# Patient Record
Sex: FEMALE | Race: WHITE | Employment: PART TIME | URBAN - METROPOLITAN AREA
[De-identification: names, ages, dates, MRNs, and addresses within clinical notes are randomized per-mention and may not be internally consistent; named-entity substitution may affect disease eponyms.]

---

## 2020-10-21 ENCOUNTER — OFFICE VISIT (OUTPATIENT)
Dept: CARDIOLOGY CLINIC | Age: 55
End: 2020-10-21
Payer: COMMERCIAL

## 2020-10-21 VITALS
WEIGHT: 165 LBS | DIASTOLIC BLOOD PRESSURE: 82 MMHG | OXYGEN SATURATION: 98 % | HEART RATE: 87 BPM | SYSTOLIC BLOOD PRESSURE: 126 MMHG | HEIGHT: 62 IN | BODY MASS INDEX: 30.36 KG/M2

## 2020-10-21 DIAGNOSIS — Z82.49 FAMILY HISTORY OF EARLY CAD: ICD-10-CM

## 2020-10-21 DIAGNOSIS — R53.1 PARESTHESIAS WITH SUBJECTIVE WEAKNESS: ICD-10-CM

## 2020-10-21 DIAGNOSIS — R07.9 CHEST PAIN, UNSPECIFIED TYPE: ICD-10-CM

## 2020-10-21 DIAGNOSIS — R20.2 PARESTHESIAS WITH SUBJECTIVE WEAKNESS: ICD-10-CM

## 2020-10-21 DIAGNOSIS — I49.3 PVC (PREMATURE VENTRICULAR CONTRACTION): ICD-10-CM

## 2020-10-21 DIAGNOSIS — R00.2 PALPITATIONS: ICD-10-CM

## 2020-10-21 DIAGNOSIS — J45.909 UNCOMPLICATED ASTHMA, UNSPECIFIED ASTHMA SEVERITY, UNSPECIFIED WHETHER PERSISTENT: ICD-10-CM

## 2020-10-21 DIAGNOSIS — R07.9 CHEST PAIN, UNSPECIFIED TYPE: Primary | ICD-10-CM

## 2020-10-21 PROCEDURE — 93000 ELECTROCARDIOGRAM COMPLETE: CPT | Performed by: INTERNAL MEDICINE

## 2020-10-21 PROCEDURE — 99204 OFFICE O/P NEW MOD 45 MIN: CPT | Performed by: INTERNAL MEDICINE

## 2020-10-21 RX ORDER — PREGABALIN 100 MG/1
CAPSULE ORAL
COMMUNITY
Start: 2020-09-28

## 2020-10-21 RX ORDER — BISMUTH SUBSALICYLATE 262 MG
1 TABLET,CHEWABLE ORAL DAILY
COMMUNITY
End: 2021-08-11

## 2020-10-21 NOTE — LETTER
10/21/2020 8:53 AM 
 
Ms. Zannie Brunner [address] [facility] [id number] [place] [address] Zannie Brunner was seen in our office on 10/21/2020 for cardiac evaluation. Please feel free to contact our office if you have any questions regarding this patient. Sincerely, Russell Patricia MD

## 2020-10-21 NOTE — PROGRESS NOTES
History of Present Illness:  42-year-old female referred for atypical left-sided chest pain associated with occasional palpitations and heart racing. She just has not felt herself about the past month. She has no syncope. She does have a history of asthma with recently starting new inhaler, but symptoms do not seem to correlate with this. She does have a family history of blood clots and heart disease, specifically with her mother. She tells me her mother actually flat lined during one of her pregnancies. No tobacco or alcohol use. She works at the [employer]. Impression:  1. Recent atypical chest pain. 2. Ongoing palpitations and paresthesias, unclear etiology. 3. History of asthma, on inhalers. 4. Strong family history of heart disease, as well as DVT. 5. Recent Holter monitor for about 24 hours, showing sinus tachycardia only. Plan:  I discussed with her her symptoms. At this point, for further risk stratification we will obtain an echocardiogram, as well as nuclear stress test, especially given her family history. Her LDL was 110s. At this point I would just recommend diet, exercise and close monitoring. I talked about the possibility that this could be sinus tachycardia, physiologic, but the time interval in which we monitored was somewhat limited, therefore we will proceed to event monitor for about a week. I will see her back after testing. All questions answered. Past Medical History:   Diagnosis Date    Asthma     Nerve damage     Shingles        Current Outpatient Medications   Medication Sig Dispense Refill    pregabalin (LYRICA) 100 mg capsule TAKE 1 CAPSULE BY MOUTH EVERY EVENING      cetirizine HCl (ZYRTEC PO) Take 10 mg by mouth nightly.  multivitamin (ONE A DAY) tablet Take 1 Tab by mouth daily. Social History   reports that she has never smoked. She has never used smokeless tobacco.   reports no history of alcohol use.     Family History  family history includes Cancer in her father; Deep Vein Thrombosis in her mother; Heart Attack in her mother. Review of Systems  Except as stated above include:  Constitutional: Negative for fever, chills and malaise/fatigue. HEENT: No congestion or recent URI. Gastrointestinal: No nausea, vomiting, abdominal pain, bloody stools. Pulmonary:  Negative except as stated above. Cardiac:  Negative except as stated above. Musculoskeletal: Negative except as stated above. Neurological:  No localized symptoms. Skin:  Negative except as stated above. Psych:  Negative except as stated above. Endocrine:  Negative except as stated above. PHYSICAL EXAM  BP Readings from Last 3 Encounters:   10/21/20 126/82     Pulse Readings from Last 3 Encounters:   10/21/20 87     Wt Readings from Last 3 Encounters:   10/21/20 74.8 kg (165 lb)     General:   Well developed, well groomed. Head/Neck:   No obvious jugular venous distention     No obvious carotid pulsations. No evidence of xanthelasma. Lungs:   No respiratory distress. Clear bilaterally. Heart:    Regular rate and rhythm. Normal S1/S2. Palpation grossly normal.    No significant murmurs, rubs or gallops. Abdomen:   Non-acute abdomen. No obvious pulsations. Extremities:   Intact peripheral pulses. No significant edema. Neurological:   Alert and oriented to person, place, time. No focal neurological deficit visually. Skin:   No obvious rash    Blood Pressure Metric:  Monitor recommended and adjustments stated if needed.

## 2020-11-05 ENCOUNTER — TELEPHONE (OUTPATIENT)
Dept: CARDIOLOGY CLINIC | Age: 55
End: 2020-11-05

## 2020-11-06 LAB
ECHO AO ROOT DIAM: 2.94 CM
ECHO LA AREA 4C: 16.5 CM2
ECHO LA VOL 2C: 49.69 ML (ref 22–52)
ECHO LA VOL 4C: 41.02 ML (ref 22–52)
ECHO LA VOL BP: 49.96 ML (ref 22–52)
ECHO LA VOL/BSA BIPLANE: 28.36 ML/M2 (ref 16–28)
ECHO LA VOLUME INDEX A2C: 28.21 ML/M2 (ref 16–28)
ECHO LA VOLUME INDEX A4C: 23.29 ML/M2 (ref 16–28)
ECHO LV E' LATERAL VELOCITY: 12.3 CM/S
ECHO LV E' SEPTAL VELOCITY: 8.97 CM/S
ECHO LV INTERNAL DIMENSION DIASTOLIC: 3.87 CM (ref 3.9–5.3)
ECHO LV INTERNAL DIMENSION SYSTOLIC: 2.38 CM
ECHO LV IVSD: 1.07 CM (ref 0.6–0.9)
ECHO LV MASS 2D: 131.2 G (ref 67–162)
ECHO LV MASS INDEX 2D: 74.5 G/M2 (ref 43–95)
ECHO LV POSTERIOR WALL DIASTOLIC: 1.05 CM (ref 0.6–0.9)
ECHO LVOT CARDIAC OUTPUT: 4.44 LITER/MINUTE
ECHO LVOT DIAM: 1.96 CM
ECHO LVOT PEAK GRADIENT: 3.25 MMHG
ECHO LVOT PEAK VELOCITY: 90.15 CM/S
ECHO LVOT SV: 61.5 ML
ECHO LVOT VTI: 20.41 CM
ECHO MV A VELOCITY: 92.22 CM/S
ECHO MV E DECELERATION TIME (DT): 161.74 MS
ECHO MV E VELOCITY: 75.37 CM/S
ECHO MV E/A RATIO: 0.82
ECHO MV E/E' LATERAL: 6.13
ECHO MV E/E' RATIO (AVERAGED): 7.27
ECHO MV E/E' SEPTAL: 8.4
ECHO PVEIN A DURATION: 98.33 MS
ECHO PVEIN A VELOCITY: 31.27 CM/S
ECHO RV TAPSE: 1.95 CM (ref 1.5–2)
ECHO TV REGURGITANT MAX VELOCITY: 169.68 CM/S
ECHO TV REGURGITANT PEAK GRADIENT: 11.52 MMHG
LVOT MG: 1.68 MMHG
STRESS BASELINE DIAS BP: 70 MMHG
STRESS BASELINE HR: 78 BPM
STRESS BASELINE SYS BP: 110 MMHG
STRESS PEAK SYS BP: NORMAL MMHG
STRESS PERCENT HR ACHIEVED: 73 %
STRESS POST PEAK HR: 120 BPM
STRESS ST DEPRESSION: 0 MM
STRESS ST ELEVATION: 0 MM
STRESS STAGE 1 BP: NORMAL MMHG
STRESS STAGE 1 DURATION: 3 MIN:SEC
STRESS STAGE 1 HR: 120 BPM
STRESS STAGE RECOVERY 1 BP: NORMAL MMHG
STRESS STAGE RECOVERY 1 DURATION: 1 MIN:SEC
STRESS STAGE RECOVERY 1 HR: 98 BPM
STRESS TARGET HR: 165 BPM

## 2020-11-09 NOTE — PROGRESS NOTES
Per your last note \" I discussed with her her symptoms. At this point, for further risk stratification we will obtain an echocardiogram, as well as nuclear stress test, especially given her family history. Her LDL was 110s. At this point I would just recommend diet, exercise and close monitoring. I talked about the possibility that this could be sinus tachycardia, physiologic, but the time interval in which we monitored was somewhat limited, therefore we will proceed to event monitor for about a week. I will see her back after testing. All questions answered.

## 2020-11-10 ENCOUNTER — TELEPHONE (OUTPATIENT)
Dept: CARDIOLOGY CLINIC | Age: 55
End: 2020-11-10

## 2020-11-10 NOTE — TELEPHONE ENCOUNTER
----- Message from Mikaela Jewell MD sent at 11/9/2020  8:01 AM EST -----  Please let her know echo normal but stress test might be mildy abnormal, cannot tell for sure as there is \"artifact\", meaning part of the diaphragm pushing up on heart. Please move up her apt so I can discuss in person. Thx  ----- Message -----  From: Yamile Orantes RN  Sent: 11/9/2020   6:59 AM EST  To: Mikaela Jewell MD    Per your last note \" I  discussed with her her symptoms. At this point, for further risk stratification we will obtain an echocardiogram, as well as nuclear stress test, especially given her family history. Her LDL was 110s. At this point I would just recommend diet, exercise and close monitoring. I talked about the possibility that this could be sinus tachycardia, physiologic, but the time interval in which we monitored was somewhat limited, therefore we will proceed to event monitor for about a week. I will see her back after testing.   All questions answered.

## 2020-11-12 ENCOUNTER — OFFICE VISIT (OUTPATIENT)
Dept: CARDIOLOGY CLINIC | Age: 55
End: 2020-11-12
Payer: COMMERCIAL

## 2020-11-12 VITALS
DIASTOLIC BLOOD PRESSURE: 78 MMHG | WEIGHT: 166 LBS | BODY MASS INDEX: 30.55 KG/M2 | OXYGEN SATURATION: 98 % | SYSTOLIC BLOOD PRESSURE: 122 MMHG | HEIGHT: 62 IN

## 2020-11-12 DIAGNOSIS — R94.39 ABNORMAL STRESS TEST: Primary | ICD-10-CM

## 2020-11-12 DIAGNOSIS — R07.9 CHEST PAIN, UNSPECIFIED TYPE: ICD-10-CM

## 2020-11-12 DIAGNOSIS — R00.2 PALPITATIONS: ICD-10-CM

## 2020-11-12 DIAGNOSIS — I49.3 PVC (PREMATURE VENTRICULAR CONTRACTION): ICD-10-CM

## 2020-11-12 DIAGNOSIS — R94.39 ABNORMAL STRESS TEST: ICD-10-CM

## 2020-11-12 DIAGNOSIS — Z82.49 FAMILY HISTORY OF EARLY CAD: ICD-10-CM

## 2020-11-12 LAB
A-G RATIO,AGRAT: 1.3 RATIO (ref 1.1–2.6)
ABSOLUTE LYMPHOCYTE COUNT, 10803: 1.6 K/UL (ref 1–4.8)
ALBUMIN SERPL-MCNC: 3.9 G/DL (ref 3.5–5)
ALP SERPL-CCNC: 86 U/L (ref 25–115)
ALT SERPL-CCNC: 16 U/L (ref 5–40)
ANION GAP SERPL CALC-SCNC: 10 MMOL/L (ref 3–15)
AST SERPL W P-5'-P-CCNC: 18 U/L (ref 10–37)
BASOPHILS # BLD: 0.1 K/UL (ref 0–0.2)
BASOPHILS NFR BLD: 1 % (ref 0–2)
BILIRUB SERPL-MCNC: 0.5 MG/DL (ref 0.2–1.2)
BUN SERPL-MCNC: 12 MG/DL (ref 6–22)
CALCIUM SERPL-MCNC: 9.7 MG/DL (ref 8.4–10.5)
CHLORIDE SERPL-SCNC: 103 MMOL/L (ref 98–110)
CO2 SERPL-SCNC: 29 MMOL/L (ref 20–32)
CREAT SERPL-MCNC: 0.7 MG/DL (ref 0.5–1.2)
EOSINOPHIL # BLD: 0.2 K/UL (ref 0–0.5)
EOSINOPHIL NFR BLD: 2 % (ref 0–6)
ERYTHROCYTE [DISTWIDTH] IN BLOOD BY AUTOMATED COUNT: 13.1 % (ref 10–15.5)
GFRAA, 66117: >60
GFRNA, 66118: >60
GLOBULIN,GLOB: 2.9 G/DL (ref 2–4)
GLUCOSE SERPL-MCNC: 91 MG/DL (ref 70–99)
GRANULOCYTES,GRANS: 65 % (ref 40–75)
HCT VFR BLD AUTO: 38.2 % (ref 35.1–48)
HGB BLD-MCNC: 12.4 G/DL (ref 11.7–16)
INR PPP: 1.03 (ref 0.89–1.29)
LYMPHOCYTES, LYMLT: 23 % (ref 20–45)
MCH RBC QN AUTO: 28 PG (ref 26–34)
MCHC RBC AUTO-ENTMCNC: 33 G/DL (ref 31–36)
MCV RBC AUTO: 86 FL (ref 81–99)
MONOCYTES # BLD: 0.6 K/UL (ref 0.1–1)
MONOCYTES NFR BLD: 9 % (ref 3–12)
NEUTROPHILS # BLD AUTO: 4.4 K/UL (ref 1.8–7.7)
PLATELET # BLD AUTO: 320 K/UL (ref 140–440)
PMV BLD AUTO: 10.9 FL (ref 9–13)
POTASSIUM SERPL-SCNC: 4.1 MMOL/L (ref 3.5–5.5)
PROT SERPL-MCNC: 6.8 G/DL (ref 6.4–8.3)
PROTHROMBIN TIME: 10.5 SEC (ref 9–13)
RBC # BLD AUTO: 4.46 M/UL (ref 3.8–5.2)
SODIUM SERPL-SCNC: 142 MMOL/L (ref 133–145)
WBC # BLD AUTO: 6.8 K/UL (ref 4–11)

## 2020-11-12 PROCEDURE — 93000 ELECTROCARDIOGRAM COMPLETE: CPT | Performed by: INTERNAL MEDICINE

## 2020-11-12 PROCEDURE — 99215 OFFICE O/P EST HI 40 MIN: CPT | Performed by: INTERNAL MEDICINE

## 2020-11-12 RX ORDER — ASPIRIN 325 MG
162 TABLET ORAL DAILY
Status: CANCELLED | OUTPATIENT
Start: 2020-11-12

## 2020-11-12 RX ORDER — PREDNISONE 20 MG/1
TABLET ORAL
Qty: 6 TAB | Refills: 0 | Status: SHIPPED | OUTPATIENT
Start: 2020-11-12 | End: 2020-12-09

## 2020-11-12 RX ORDER — SODIUM CHLORIDE 9 MG/ML
1000 INJECTION, SOLUTION INTRAVENOUS CONTINUOUS
Status: CANCELLED | OUTPATIENT
Start: 2020-11-12

## 2020-11-12 RX ORDER — SODIUM CHLORIDE 0.9 % (FLUSH) 0.9 %
5-40 SYRINGE (ML) INJECTION EVERY 8 HOURS
Status: CANCELLED | OUTPATIENT
Start: 2020-11-12

## 2020-11-12 RX ORDER — SODIUM CHLORIDE 0.9 % (FLUSH) 0.9 %
5-40 SYRINGE (ML) INJECTION AS NEEDED
Status: CANCELLED | OUTPATIENT
Start: 2020-11-12

## 2020-11-12 NOTE — PATIENT INSTRUCTIONS
Instructions Patients Name:  Rubi Sweeney 1. You are scheduled to have a Left Heart Cath on November 17 , 2020  at 1030 am Please check in at 0930 am  . 2. Please go to [facility] and park in the outpatient parking lot that is located around to the back of the hospital and enter through the [facility] building. Once you enter through the [facility] check in with the  there. The  will either give you directions or assist you in getting to the cath holding area. 3. You are not to eat or drink anything after midnight the night before your procedure. Small sips of water to take your medications is ok. 4. If you are diabetic, do not take your insulin/sugar pill the morning of the procedure. 5. MEDICATION INSTRUCTIONS:   Please take your morning medications with the following special instructions: 
 
[x]          Please make sure to take your Blood pressure medication :  
 
 
[x]          Take Prednisone 60 mg and Benadryl 25 mg by mouth at Bedtime on November 16 , 2020 and again on November 17 , 2020 at 0900 am  . DO NOT drive after taking the Benadryl. This is to prevent you from having an allergic reaction to the dye. 6. We encourage families to wait in the waiting room on the first floor while the procedure is being done. The Doctor will come out and talk with you as soon as the procedure is over. 7. There is the possibility that you may spend the night in the hospital, depending on the results of the procedure. This will be determined after the procedure is done. If angioplasty or stent is planned, you will stay at least one day. 8. If you or your family have any questions, please call our office Monday Friday, 9:00 a. m.4:30 p.m.,  At [phone], and ask to speak to one of the nurses.

## 2020-11-12 NOTE — LETTER
11/12/2020 9:31 AM 
 
Ms. Anu Amaro [address] [address] [place] [address] Anu Amaro was seen in our office on 11/12/2020 for cardiac evaluation. From a cardiac standpoint she can return to work tomorrow 11/13/2020. Please feel free to contact our office if you have any questions regarding this patient. Sincerely, Maren Medley MD

## 2020-11-12 NOTE — PROGRESS NOTES
History of Present Illness:  54-year-old female here for follow up. She was initially referred for some atypical left-sided chest pain associated with palpitations and heart racing. She had just not been feeling herself for the past month or two. She did not have any syncope. She does have a history of asthma and has some exertional dyspnea at times, which she related to the asthma. She has a family history significant for blood clots, PE, as well as coronary artery disease and stent in her mother prematurely. She also tells me that her mother actually flat lined during one of her pregnancies of unclear etiology. No tobacco or alcohol use. She had stress test within the past week and she is here to discuss the results as it was mildly abnormal.  Today she is doing relatively well. She does have some palpitations that wake her at night and she still has the exertional dyspnea. No significant recurrent chest pain, however. Impression:  1. Abnormal stress test, possible apical ischemia, for which I discussed possible medical therapy versus close monitoring or heart catheterization. She has elected for heart catheterization. 2. Recent atypical chest pain. 3. History of palpitations and paresthesias with recent Holter and event monitor showing sinus tachycardia with rare ectopic beats. Conservative management for now. 4. History of asthma, on inhalers. 5. Strong family history of heart disease, as well as DVT. 6. Echocardiogram November, 2020 with normal function  7. SHELLFISH ALLERGY. Plan:  Again I discussed risks, benefits and alternatives to close monitoring versus repeat testing or possible heart catheterization. She would like to know for sure that she does not have any heart disease and she understands the risks. SHE HAS A SHELLFISH ALLERGY. I am going to premedicate with prednisone and Benadryl in anticipation of the heart catheterization.       Past Medical History:   Diagnosis Date    Asthma     Nerve damage     Shingles        Current Outpatient Medications   Medication Sig Dispense Refill    pregabalin (LYRICA) 100 mg capsule TAKE 1 CAPSULE BY MOUTH EVERY EVENING      cetirizine HCl (ZYRTEC PO) Take 10 mg by mouth nightly.  multivitamin (ONE A DAY) tablet Take 1 Tab by mouth daily. Social History   reports that she has never smoked. She has never used smokeless tobacco.   reports no history of alcohol use. Family History  family history includes Cancer in her father; Deep Vein Thrombosis in her mother; Heart Attack in her mother. Review of Systems  Except as stated above include:  Constitutional: Negative for fever, chills and malaise/fatigue. HEENT: No congestion or recent URI. Gastrointestinal: No nausea, vomiting, abdominal pain, bloody stools. Pulmonary:  Negative except as stated above. Cardiac:  Negative except as stated above. Musculoskeletal: Negative except as stated above. Neurological:  No localized symptoms. Skin:  Negative except as stated above. Psych:  Negative except as stated above. Endocrine:  Negative except as stated above. PHYSICAL EXAM  BP Readings from Last 3 Encounters:   11/12/20 122/78   11/06/20 126/82   10/21/20 126/82     Pulse Readings from Last 3 Encounters:   10/21/20 87     Wt Readings from Last 3 Encounters:   11/12/20 75.3 kg (166 lb)   11/06/20 74.8 kg (165 lb)   11/06/20 74.8 kg (165 lb)     General:   Well developed, well groomed. Head/Neck:   No obvious jugular venous distention     No obvious carotid pulsations. No evidence of xanthelasma. Lungs:   No respiratory distress. Clear bilaterally. Heart:  Regular rate and rhythm. Normal S1/S2. Palpation grossly normal.    No significant murmurs, rubs or gallops. Abdomen:   Non-acute abdomen. No obvious pulsations. Extremities:   Intact peripheral pulses. No significant edema.     Neurological:   Alert and oriented to person, place, time.      No focal neurological deficit visually. Skin:   No obvious rash    Blood Pressure Metric:  Monitor recommended and adjustments stated if needed.

## 2020-11-12 NOTE — PROGRESS NOTES
Bernetta Hamman presents today for   Chief Complaint   Patient presents with    Follow-up     follow up for [facility] preferred language for health care discussion is english/other. Is someone accompanying this pt? no    Is the patient using any DME equipment during [address]? no    Depression Screening:  3 most recent PHQ Screens 11/12/2020   Little interest or pleasure in doing things Not at all   Feeling down, depressed, irritable, or hopeless Not at all   Total Score PHQ 2 0       Learning Assessment:  Learning Assessment 11/12/2020   PRIMARY LEARNER Patient   PRIMARY LANGUAGE ENGLISH   LEARNER PREFERENCE PRIMARY DEMONSTRATION   ANSWERED BY patient   RELATIONSHIP SELF       Abuse Screening:  Abuse Screening Questionnaire 11/12/2020   Do you ever feel afraid of your partner? N   Are you in a relationship with someone who physically or mentally threatens you? N   Is it safe for you to go home? Y       Fall Risk  Fall Risk Assessment, last 12 mths 11/12/2020   Able to walk? Yes   Fall in past 12 months? No       Pt currently taking Anticoagulant therapy? no    Coordination of Care:  1. Have you been to the ER, urgent care clinic since your last visit? Hospitalized since your last visit? no    2. Have you seen or consulted any other health care providers outside of the [address] [place] [address] since your last visit? Include any pap smears or colon screening.  no

## 2020-11-17 ENCOUNTER — HOSPITAL ENCOUNTER (OUTPATIENT)
Age: 55
Setting detail: OUTPATIENT SURGERY
Discharge: HOME | End: 2020-11-17
Attending: INTERNAL MEDICINE | Admitting: INTERNAL MEDICINE
Payer: COMMERCIAL

## 2020-11-17 VITALS
HEART RATE: 108 BPM | OXYGEN SATURATION: 98 % | SYSTOLIC BLOOD PRESSURE: 121 MMHG | DIASTOLIC BLOOD PRESSURE: 79 MMHG | RESPIRATION RATE: 18 BRPM

## 2020-11-17 DIAGNOSIS — R93.1 ABNORMAL ECHOCARDIOGRAM: ICD-10-CM

## 2020-11-17 DIAGNOSIS — I20.8 STABLE ANGINA PECTORIS (HCC): ICD-10-CM

## 2020-11-17 PROCEDURE — C1894 INTRO/SHEATH, NON-LASER: HCPCS | Performed by: INTERNAL MEDICINE

## 2020-11-17 PROCEDURE — 74011000636 HC RX REV CODE- 636: Performed by: INTERNAL MEDICINE

## 2020-11-17 PROCEDURE — 77030019569 HC BND COMPR RAD TERU -B: Performed by: INTERNAL MEDICINE

## 2020-11-17 PROCEDURE — 93458 L HRT ARTERY/VENTRICLE ANGIO: CPT | Performed by: INTERNAL MEDICINE

## 2020-11-17 PROCEDURE — 99152 MOD SED SAME PHYS/QHP 5/>YRS: CPT | Performed by: INTERNAL MEDICINE

## 2020-11-17 PROCEDURE — 77030013797 HC KT TRNSDUC PRSSR EDWD -A: Performed by: INTERNAL MEDICINE

## 2020-11-17 PROCEDURE — 74011250636 HC RX REV CODE- 250/636: Performed by: INTERNAL MEDICINE

## 2020-11-17 PROCEDURE — 99153 MOD SED SAME PHYS/QHP EA: CPT | Performed by: INTERNAL MEDICINE

## 2020-11-17 PROCEDURE — 74011250637 HC RX REV CODE- 250/637: Performed by: INTERNAL MEDICINE

## 2020-11-17 PROCEDURE — 74011000250 HC RX REV CODE- 250: Performed by: INTERNAL MEDICINE

## 2020-11-17 PROCEDURE — 77030015766: Performed by: INTERNAL MEDICINE

## 2020-11-17 RX ORDER — MIDAZOLAM HYDROCHLORIDE 1 MG/ML
INJECTION, SOLUTION INTRAMUSCULAR; INTRAVENOUS AS NEEDED
Status: DISCONTINUED | OUTPATIENT
Start: 2020-11-17 | End: 2020-11-17 | Stop reason: HOSPADM

## 2020-11-17 RX ORDER — LIDOCAINE HYDROCHLORIDE 10 MG/ML
INJECTION, SOLUTION EPIDURAL; INFILTRATION; INTRACAUDAL; PERINEURAL AS NEEDED
Status: DISCONTINUED | OUTPATIENT
Start: 2020-11-17 | End: 2020-11-17 | Stop reason: HOSPADM

## 2020-11-17 RX ORDER — SODIUM CHLORIDE 9 MG/ML
1000 INJECTION, SOLUTION INTRAVENOUS CONTINUOUS
Status: DISCONTINUED | OUTPATIENT
Start: 2020-11-17 | End: 2020-11-17 | Stop reason: HOSPADM

## 2020-11-17 RX ORDER — SODIUM CHLORIDE 0.9 % (FLUSH) 0.9 %
5-40 SYRINGE (ML) INJECTION AS NEEDED
Status: DISCONTINUED | OUTPATIENT
Start: 2020-11-17 | End: 2020-11-17 | Stop reason: HOSPADM

## 2020-11-17 RX ORDER — ASPIRIN 325 MG
162 TABLET ORAL DAILY
Status: DISCONTINUED | OUTPATIENT
Start: 2020-11-17 | End: 2020-11-17

## 2020-11-17 RX ORDER — HEPARIN SODIUM 1000 [USP'U]/ML
INJECTION, SOLUTION INTRAVENOUS; SUBCUTANEOUS AS NEEDED
Status: DISCONTINUED | OUTPATIENT
Start: 2020-11-17 | End: 2020-11-17 | Stop reason: HOSPADM

## 2020-11-17 RX ORDER — SODIUM CHLORIDE 0.9 % (FLUSH) 0.9 %
5-40 SYRINGE (ML) INJECTION EVERY 8 HOURS
Status: DISCONTINUED | OUTPATIENT
Start: 2020-11-17 | End: 2020-11-17 | Stop reason: HOSPADM

## 2020-11-17 RX ORDER — GUAIFENESIN 100 MG/5ML
162 LIQUID (ML) ORAL DAILY
Status: DISCONTINUED | OUTPATIENT
Start: 2020-11-17 | End: 2020-11-17 | Stop reason: HOSPADM

## 2020-11-17 RX ORDER — FENTANYL CITRATE 50 UG/ML
INJECTION, SOLUTION INTRAMUSCULAR; INTRAVENOUS AS NEEDED
Status: DISCONTINUED | OUTPATIENT
Start: 2020-11-17 | End: 2020-11-17 | Stop reason: HOSPADM

## 2020-11-17 RX ADMIN — ASPIRIN 162 MG: 81 TABLET, CHEWABLE ORAL at 11:58

## 2020-11-17 RX ADMIN — SODIUM CHLORIDE 1000 ML: 900 INJECTION, SOLUTION INTRAVENOUS at 11:28

## 2020-11-17 NOTE — Clinical Note
TRANSFER - IN REPORT:     Verbal report received from: CHRYSTAL DANIEL) [facility]. Report consisted of patient's Situation, Background, Assessment and   Recommendations(SBAR). Opportunity for questions and clarification was provided. Assessment completed upon patient's arrival to unit and care assumed. Patient transported with a Cardiac Cath Tech / Patient Care Tech.

## 2020-11-17 NOTE — Clinical Note
Contrast Dose Calculator:   Patient's age: 54.   Patient's sex: Female. Patient weight (kg) = 75.8. Creatinine level (mg/dL) = 0.7. Creatinine clearance (mL/min): 108.66. Contrast concentration (mg/mL) = 300. MACD = 300 mL. Max Contrast dose per Creatinine Cl calculator = 244.49 mL.

## 2020-11-17 NOTE — DISCHARGE INSTRUCTIONS
HEART CATHETERIZATION/ANGIOGRAPHY DISCHARGE INSTRUCTIONS    1. Check puncture site frequently for swelling or bleeding. If there is any bleeding, lie down and apply pressure over the area with a clean towel or washcloth. Notify your doctor for any redness, swelling, drainage, or oozing from the puncture site. Notify your doctor for any fever or chills. 2. If the extremity becomes cold, numb, or painful go to the Emergency Room. 3. Activity should be limited for the next 48 hours. Climb stairs as little as possible and avoid any stooping, bending, or strenuous activity for 48 hours. No heavy lifting (anything over 8 pounds) for 5 days. 4. You may resume your usual diet. Drink more fluids than usual.  5. Have a responsible person drive you home and stay with you for at least 24 hours after your heart catheterization/angiography. 6. You may remove bandage from your Right Wrist in 24 hours. You may shower in 24 hours. No tub baths, hot tubs, or swimming for 1 week. Do not place any lotions, creams, powders, or ointments over puncture site for 1 week. You may place a clean band-aid over the puncture site each day for 5 days. Change daily. 7. If you take Metformin, do not take it for 48 hours. 8. Ask your nurse when to restart any blood thinners. How can you care for yourself at home? Activity  · Do not do strenuous exercise and do not lift, pull, or push anything heavy until your doctor says it is okay. This may be for a day or two. You can walk around the house and do light activity, such as cooking. · You may shower 24 to 48 hours after the procedure, if your doctor okays it. Pat the incision dry. Do not take a bath for 1 week, or until your doctor tells you it is okay. · If the catheter was placed in your groin, try not to walk up stairs for the first couple of days. · If the catheter was placed in your arm near your wrist, do not bend your wrist deeply for the first couple of days.  Be careful using your hand to get into and out of a chair or bed. · If your doctor recommends it, get more exercise. Walking is a good choice. Bit by bit, increase the amount you walk every day. Try for at least 30 minutes on most days of the week. Diet  · Drink plenty of fluids to help your body flush out the dye. If you have kidney, heart, or liver disease and have to limit fluids, talk with your doctor before you increase the amount of fluids you drink. · Keep eating a heart-healthy diet that has lots of fruits, vegetables, and whole grains. If you have not been eating this way, talk to your doctor. You also may want to talk to a dietitian. This expert can help you to learn about healthy foods and plan meals. Medicines  · Your doctor will tell you if and when you can restart your medicines. He or she will also give you instructions about taking any new medicines. · If you take blood thinners, such as warfarin (Coumadin), clopidogrel (Plavix), or aspirin, be sure to talk to your doctor. He or she will tell you if and when to start taking those medicines again. Make sure that you understand exactly what your doctor wants you to do. · Your doctor may prescribe a blood-thinning medicine like aspirin or clopidogrel (Plavix). It is very important that you take these medicines exactly as directed in order to keep the coronary artery open and reduce your risk of a heart attack. Be safe with medicines. Call your doctor if you think you are having a problem with your medicine. Care of the catheter site  · For the first 3 days, keep a bandage over the spot where the catheter was inserted. · Put ice or a cold pack on the area for 10 to 20 minutes at a time to help with soreness or swelling. Put a thin cloth between the ice and your skin. Sedation for a Medical Procedure: Care Instructions  Your Care Instructions  For a minor procedure or surgery, you will get a sedative to help you relax. This drug will make you sleepy.  It is usually given in a vein (by IV). A shot may also be used to numb the area. If you had local anesthesia, you may feel some pain and discomfort as it wears off. If you have pain, don't be afraid to say so. Pain medicine works better if you take it before the pain gets bad. Common side effects from sedation include:  · Feeling sleepy. (Your doctors and nurses will make sure you are not too sleepy to go home.)  · Nausea and vomiting. This usually does not last long. · Feeling tired. Follow-up care is a key part of your treatment and safety. Be sure to make and go to all appointments, and call your doctor if you are having problems. It's also a good idea to know your test results and keep a list of the medicines you take. How can you care for yourself at home? Activity  · Don't do anything for 24 hours that requires attention to detail. It takes time for the medicine effects to completely wear off. · For your safety, you should not drive or operate any machinery that could be dangerous until the medicine wears off and you can think clearly and react easily. · Rest when you feel tired. Getting enough sleep will help you recover. Diet  · You can eat your normal diet, unless your doctor gives you other instructions. If your stomach is upset, try clear liquids and bland, low-fat foods like plain toast or rice. · Drink plenty of fluids (unless your doctor tells you not to). · Don't drink alcohol for 24 hours. Medicines  · Be safe with medicines. Read and follow all instructions on the label. ¨ If the doctor gave you a prescription medicine for pain, take it as prescribed. ¨ If you are not taking a prescription pain medicine, ask your doctor if you can take an over-the-counter medicine. · If you think your pain medicine is making you sick to your stomach:  ¨ Take your medicine after meals (unless your doctor has told you not to). ¨ Ask your doctor for a different pain medicine.     Follow-up care is a key part of your treatment and safety. Be sure to make and go to all appointments, and call your doctor if you are having problems. It's also a good idea to know your test results and keep a list of the medicines you take. When should you call for help? Call 911 anytime you think you may need emergency care. For example, call if:  · You passed out (lost consciousness). · You have severe trouble breathing. · You have sudden chest pain and shortness of breath, or you cough up blood. · You have symptoms of a heart attack. These may include:  ¨ Chest pain or pressure, or a strange feeling in the chest.  ¨ Sweating. ¨ Shortness of breath. ¨ Nausea or vomiting. ¨ Pain, pressure, or a strange feeling in the back, neck, jaw, or upper belly, or in one or both shoulders or arms. ¨ Lightheadedness or sudden weakness. ¨ A fast or irregular heartbeat. After you call 911, the  may tel you to chew 1 adult-strength or 2 to 4 low-dose aspirin. Wait for an ambulance. Do not try to drive yourself. · You have been diagnosed with angina, and you have symptoms that do not go away with rest or are not getting better within 5 minutes after you take a dose of nitroglycerin. Call your doctor now or seek immediate medical care if:  · You are bleeding from the area where the catheter was put in your artery. · You have a fast-growing, painful lump at the catheter site. · You have signs of infection, such as:  ¨ Increased pain, swelling, warmth, or redness. ¨ Red streaks leading from the catheter site. ¨ Pus draining from the catheter site. ¨ A fever. · Your leg or arm looks blue or feels cold, numb, or tingly. These are general instructions for a healthy lifestyle:    No smoking/ No tobacco products/ Avoid exposure to second hand smoke    Surgeon General's Warning:  Quitting smoking now greatly reduces serious risk to your health.     Obesity, smoking, and sedentary lifestyle greatly increases your risk for illness    A healthy diet, regular physical exercise & weight monitoring are important for maintaining a healthy lifestyle    You may be retaining fluid if you have a history of heart failure or if you experience any of the following symptoms:  Weight gain of 3 pounds or more overnight or 5 pounds in a week, increased swelling in our hands or feet or shortness of breath while lying flat in bed. Please call your doctor as soon as you notice any of these symptoms; do not wait until your next office visit. Recognize signs and symptoms of STROKE:    F-face looks uneven    A-arms unable to move or move unevenly    S-speech slurred or non-existent    T-time-call 911 as soon as signs and symptoms begin-DO NOT go       Back to bed or wait to see if you get better-TIME IS BRAIN. Warning Signs of HEART ATTACK     Call 911 if you have these symptoms:   Chest discomfort. Most heart attacks involve discomfort in the center of the chest that lasts more than a few minutes, or that goes away and comes back. It can feel like uncomfortable pressure, squeezing, fullness, or pain.  Discomfort in other areas of the upper body. Symptoms can include pain or discomfort in one or both arms, the back, neck, jaw, or stomach.  Shortness of breath with or without chest discomfort.  Other signs may include breaking out in a cold sweat, nausea, or lightheadedness. Don't wait more than five minutes to call 911 - MINUTES MATTER! Fast action can save your life. Calling 911 is almost always the fastest way to get lifesaving treatment. Emergency Medical Services staff can begin treatment when they arrive -- up to an hour sooner than if someone gets to the hospital by car.

## 2020-11-17 NOTE — PROGRESS NOTES
Cath holding summary     Patient escorted to cath holding from waiting area ambulatory, alert and oriented x 4, voicing no complaints. Changed into gown and placed on monitor. NPO since MN. Lab results, med rec and H&P reviewed on chart. PIV x 2 inserted without difficulty. 1252  TRANSFER - OUT REPORT:    Verbal report given to Milka(name) on [facility]  being transferred to cath lab(unit) for ordered procedure       Report consisted of patients Situation, Background, Assessment and   Recommendations(SBAR). Information from the following report(s) SBAR, MAR and Pre Procedure Checklist was reviewed with the receiving nurse. Lines:   Peripheral IV 11/17/20 Anterior;Left;Proximal Antecubital (Active)       Peripheral IV 11/17/20 Posterior;Right Hand (Active)        Opportunity for questions and clarification was provided. Patient transported with:   Tech       1319  TRANSFER - IN REPORT:    Verbal report received from Carole(name) on [facility]  being received from cath lab(unit) for routine post - op      Report consisted of patients Situation, Background, Assessment and   Recommendations(SBAR). Information from the following report(s) SBAR, Procedure Summary and MAR was reviewed with the receiving nurse. Opportunity for questions and clarification was provided. Assessment completed upon patients arrival to unit and care assumed. TR band to right wrist with 8 cc of air . 1430  Tr band removed from right wrist, no bleeding or hematoma noted. Patient instructions given. 1500  Lunch provided to patient      1600  AVS Discharge instructions reviewed with patient and copy given to patient. All questions answered. Patient verbalized understanding to all discharge instructions. PIV removed. Procedural site within normal limits. No hematoma or bleeding noted from procedural and PIV site. No pain noted at discharge.   Patient discharged with support person in stable condition. Escorted out to vehicle for transport home.

## 2020-11-17 NOTE — Clinical Note
TRANSFER - OUT REPORT:     Verbal report given to: Celestine Zhong. Report consisted of patient's Situation, Background, Assessment and   Recommendations(SBAR). Opportunity for questions and clarification was provided. Patient transported with a Cardiac Cath Tech / Patient Care Tech. Patient transported to: [address]

## 2020-11-17 NOTE — PROGRESS NOTES
[address] [facility] November 17, 2020       RE: Joi Rosas      To Whom It May Concern,    This is to certify that Joi Rosas may may return to work on November 23rd, 2020. Please feel free to contact my office if you have any questions or concerns. Thank you for your assistance in this matter.       Sincerely,  Koby Doss RN    [phone]

## 2020-11-17 NOTE — H&P
Please see clinic note from [address] [facility] for detail. I saw and examined patient and confirmed above. No interval change. Labs reviewed. Procedure explained to patient and all risk and benefit discussed with patient. Risk, benefit, complication of LHC and possible PCI ( including but not limited to bleeding, infection, heart failure, stroke, MI, emergent bypass surgery, kidney failure, dialysis and death ) were discussed with patient and willing to proceed with procedure. Proceed as planned. Attempted to call spouse on phone. Unable. History and physical has been reviewed.  There have been no significant clinical changes since the completion of the originally dated History and Physical.  Will be using moderate sedation.    ------------------------------------------------------------------------------------------------------------------

## 2020-11-18 NOTE — PROGRESS NOTES
Patient called to inquire about a pea size lump at cath insertion site. Advised patient to watch wrist and make sure that it did not get any larger or become very painful. Advised if lump size increases or pain come intolerable to please follow up in her nearest emergency department.

## 2020-12-09 ENCOUNTER — OFFICE VISIT (OUTPATIENT)
Dept: CARDIOLOGY CLINIC | Age: 55
End: 2020-12-09
Payer: COMMERCIAL

## 2020-12-09 VITALS
HEART RATE: 81 BPM | DIASTOLIC BLOOD PRESSURE: 84 MMHG | WEIGHT: 164 LBS | SYSTOLIC BLOOD PRESSURE: 120 MMHG | OXYGEN SATURATION: 98 % | BODY MASS INDEX: 30.18 KG/M2 | HEIGHT: 62 IN

## 2020-12-09 DIAGNOSIS — R94.39 ABNORMAL STRESS TEST: ICD-10-CM

## 2020-12-09 DIAGNOSIS — R93.1 ABNORMAL ECHOCARDIOGRAM: ICD-10-CM

## 2020-12-09 DIAGNOSIS — Z82.49 FAMILY HISTORY OF EARLY CAD: ICD-10-CM

## 2020-12-09 DIAGNOSIS — R00.2 PALPITATIONS: Primary | ICD-10-CM

## 2020-12-09 DIAGNOSIS — R07.9 CHEST PAIN, UNSPECIFIED TYPE: ICD-10-CM

## 2020-12-09 PROCEDURE — 99214 OFFICE O/P EST MOD 30 MIN: CPT | Performed by: INTERNAL MEDICINE

## 2020-12-09 PROCEDURE — 93000 ELECTROCARDIOGRAM COMPLETE: CPT | Performed by: INTERNAL MEDICINE

## 2020-12-09 NOTE — LETTER
12/9/2020 8:27 AM 
 
Ms. Sheila Hebert [address] [facility] [address] [address] Sheila Hebert was seen in our office on 12/09/2020 for cardiac evaluation. Please feel free to contact our office if you have any questions regarding this patient. Sincerely, Anoop Chavez MD

## 2020-12-09 NOTE — PROGRESS NOTES
[place] presents today for   Chief Complaint   Patient presents with    Follow-up     1 month follow up after cath       An[place] preferred language for health care discussion is english/other. Is someone accompanying this pt? no    Is the patient using any DME equipment during [address]? no    Depression Screening:  3 most recent PHQ Screens 2020   Little interest or pleasure in doing things Not at all   Feeling down, depressed, irritable, or hopeless Not at all   Total Score PHQ 2 0       Learning Assessment:  Learning Assessment 2020   PRIMARY LEARNER Patient   PRIMARY LANGUAGE ENGLISH   LEARNER PREFERENCE PRIMARY DEMONSTRATION   ANSWERED BY patient   RELATIONSHIP SELF       Abuse Screening:  Abuse Screening Questionnaire 2020   Do you ever feel afraid of your partner? N   Are you in a relationship with someone who physically or mentally threatens you? N   Is it safe for you to go home? Y       Fall Risk  Fall Risk Assessment, last 12 mths 2020   Able to walk? Yes   Fall in past 12 months? No       Pt currently taking Anticoagulant therapy? no    Coordination of Care:  1. Have you been to the ER, urgent care clinic since your last visit? Hospitalized since your last visit? no    2. Have you seen or consulted any other health care providers outside of the [address] [place] [address] since your last visit? Include any pap smears or colon screening.  no

## 2021-06-04 NOTE — PROGRESS NOTES
Annelise Mendieta presents today for evaluation of irregular heart beats at the request of her PCP, Dr. Patrick Melendez. She is a 64year old female with history of palpitations and paresthesias with Holter and event monitor showing sinus tachycardia with rare ectopic beats, atypical chest pain (s/p cardiac cath in Nov. 2020 showing no epicardial coronary disease), asthma, shellfish allergy, and strong family history of CAD and DVT. She was last seen by Dr. James Segovia in Dec. 2020. Denies chest pain, tightness, heaviness, and admits to palpitations. Denies shortness of breath at rest, dyspnea on exertion, orthopnea and PND. Denies abdominal bloating. Denies lightheadedness, dizziness, and syncope. Denies lower extremity edema and claudication. Denies nausea, vomiting, diarrhea, melena, hematochezia. Denies hematuria, urgency, frequency. Denies fever, chills. PMH:  Past Medical History:   Diagnosis Date    Asthma     Nerve damage     Shingles        PSH:  Past Surgical History:   Procedure Laterality Date    HX HYSTERECTOMY         MEDS:        Allergies and Sensitivities:  Allergies   Allergen Reactions    Other Medication Hives and Other (comments)     SEAFOOD - \"throat closes\"    Seafood Hives    Sulfa (Sulfonamide Antibiotics) Hives and Other (comments)     \"Throat closes\"       Family History:  Family History   Problem Relation Age of Onset    Deep Vein Thrombosis Mother     Heart Attack Mother     Cancer Father        Social History:  She  reports that she has never smoked. She has never used smokeless tobacco.  She  reports no history of alcohol use.       Physical:      Exam:      Data:  EKG:      LABS:  Lab Results   Component Value Date/Time    Sodium 142 11/12/2020 10:58 AM    Potassium 4.1 11/12/2020 10:58 AM    Chloride 103 11/12/2020 10:58 AM    CO2 29 11/12/2020 10:58 AM    Glucose 91 11/12/2020 10:58 AM    BUN 12 11/12/2020 10:58 AM    Creatinine 0.7 11/12/2020 10:58 AM     No results found for: CHOL, CHOLX, CHLST, CHOLV, HDL, HDLP, LDL, LDLC, DLDLP, TGLX, TRIGL, TRIGP, CHHD, CHHDX  Lab Results   Component Value Date/Time    ALT (SGPT) 16 11/12/2020 10:58 AM         Impression/Plan:  1. Palpitations, complains of irregular heart beats  2. History of atypical chest pain, no epicardial CAD noted on cardiac cath in Nov. 2020  3. Asthma  4. Shellfish allergy  5. Family history of CAD and DVT    Ms. Ram was seen today for evaluation of complaints of irregular heart beats. She will follow-up with Dr. Hollis Hernandez as scheduled and as needed. Jordan Miranda MSN, FNP-BC    Please note:  Portions of this chart were created with Dragon medical speech to text program.  Unrecognized errors may be present.

## 2021-06-08 ENCOUNTER — OFFICE VISIT (OUTPATIENT)
Dept: CARDIOLOGY CLINIC | Age: 56
End: 2021-06-08
Payer: COMMERCIAL

## 2021-06-08 VITALS
HEIGHT: 62 IN | HEART RATE: 78 BPM | BODY MASS INDEX: 30.18 KG/M2 | OXYGEN SATURATION: 98 % | DIASTOLIC BLOOD PRESSURE: 80 MMHG | SYSTOLIC BLOOD PRESSURE: 110 MMHG | WEIGHT: 164 LBS

## 2021-06-08 DIAGNOSIS — R00.2 PALPITATIONS: ICD-10-CM

## 2021-06-08 DIAGNOSIS — I49.9 IRREGULARLY IRREGULAR PULSE RHYTHM: Primary | ICD-10-CM

## 2021-06-08 PROCEDURE — 99214 OFFICE O/P EST MOD 30 MIN: CPT | Performed by: NURSE PRACTITIONER

## 2021-06-08 RX ORDER — ALBUTEROL SULFATE 90 UG/1
2 AEROSOL, METERED RESPIRATORY (INHALATION)
COMMUNITY
Start: 2021-05-17

## 2021-06-08 RX ORDER — FLUTICASONE PROPIONATE 110 UG/1
AEROSOL, METERED RESPIRATORY (INHALATION)
COMMUNITY
Start: 2021-05-17 | End: 2022-01-12

## 2021-06-08 RX ORDER — METOPROLOL SUCCINATE 25 MG/1
25 TABLET, EXTENDED RELEASE ORAL DAILY
COMMUNITY
Start: 2021-06-03 | End: 2021-06-08 | Stop reason: ALTCHOICE

## 2021-06-08 NOTE — PROGRESS NOTES
Faith Acosta presents today for   Chief Complaint   Patient presents with    Follow-up     6 month f/u per PCP d/t irr pulse       Gil Ram preferred language for health care discussion is english/other. Is someone accompanying this pt? no    Is the patient using any DME equipment during [address]? no    Depression Screening:  3 most recent PHQ Screens 6/8/2021   Little interest or pleasure in doing things Not at all   Feeling down, depressed, irritable, or hopeless Not at all   Total Score PHQ 2 0       Learning Assessment:  Learning Assessment 12/9/2020   PRIMARY LEARNER Patient   PRIMARY LANGUAGE ENGLISH   LEARNER PREFERENCE PRIMARY DEMONSTRATION   ANSWERED BY patient   RELATIONSHIP SELF       Abuse Screening:  Abuse Screening Questionnaire 6/8/2021   Do you ever feel afraid of your partner? N   Are you in a relationship with someone who physically or mentally threatens you? N   Is it safe for you to go home? Y       Fall Risk  Fall Risk Assessment, last 12 mths 12/9/2020   Able to walk? Yes   Fall in past 12 months? No       Pt currently taking Anticoagulant therapy? no    Coordination of Care:  1. Have you been to the ER, urgent care clinic since your last visit? Hospitalized since your last visit? no    2. Have you seen or consulted any other health care providers outside of the [address] [place] [address] since your last visit? Include any pap smears or colon screening.  no

## 2021-06-08 NOTE — PROGRESS NOTES
Chief Complaint :  evaluation of irregular heart rate at the request of her PCP, Dr. Toña Posada. HPI:  James Barba is a 64 y.o. female with PMHx significant for palpitations and paresthesias with Holter and event monitor showing sinus tachycardia with rare ectopic beats, atypical chest pain (s/p cardiac cath in Nov. 2020 showing no epicardial coronary disease), asthma, shellfish allergy, strong family history of CAD and DVT. Ms. Melinda Sanderson was last seen by Dr. Simran Garibay in Dec. 2020. At that time patient continued to have palpitations however Dr. Simran Garibay was hesitant to start her on AV blocking agents, especially with borderline blood pressure. An event monitor was discussed to have performed if she continued to have events. Patient follows up today re: evaluation of irregular heart beat, rapid hr in the 130s and palpitations. Blood pressure on exam with soft reading, 110/80. Patient shares she was started on metoprolol 25mg daily by her primary care physician due to palpitations. Use of AV blocking agents were discussed with patient and she was informed of Dr. Nancy Lala recommendations at last office visit. Given his recommendations, I instructed patient to stop metoprolol until event monitor results were available. At that time Dr. Simran Garibay can provide further recommendations for treating palpitations and to also further evaluate irregular heart rate. Patient agrees with plan of care. Patient encouraged to limit caffeine intake. Patient also shares that age was COVID + in December 2020 and feels since having COVID, she has noticed more episodes of fatigue, shortness of breath and sporadic chest discomfort. She admits to sporadic chest tightness and palpitations. Admits to shortness of breath. Denies orthopnea and PND. Denies lightheadedness, dizziness, and syncope. Denies lower extremity edema and claudication.         Past Medical History:  Past Medical History:   Diagnosis Date    Asthma     Nerve damage     Shingles        Surgical History:  Past Surgical History:   Procedure Laterality Date    HX HYSTERECTOMY          Social History:  Social History     Socioeconomic History    Marital status:      Spouse name: Not on file    Number of children: Not on file    Years of education: Not on file    Highest education level: Not on file   Occupational History    Not on file   Tobacco Use    Smoking status: Never Smoker    Smokeless tobacco: Never Used   Substance and Sexual Activity    Alcohol use: Never    Drug use: Never    Sexual activity: Yes   Other Topics Concern    Not on file   Social History Narrative    Not on file     Social Determinants of Health     Financial Resource Strain:     Difficulty of Paying Living Expenses:    Food Insecurity:     Worried About Running Out of Food in the Last Year:     Ran Out of Food in the Last Year:    Transportation Needs:     Lack of Transportation (Medical):  Lack of Transportation (Non-Medical):    Physical Activity:     Days of Exercise per Week:     Minutes of Exercise per Session:    Stress:     Feeling of Stress :    Social Connections:     Frequency of Communication with Friends and Family:     Frequency of Social Gatherings with Friends and Family:     Attends Mandaen Services:     Active Member of Clubs or Organizations:     Attends Club or Organization Meetings:     Marital Status:    Intimate Partner Violence:     Fear of Current or Ex-Partner:     Emotionally Abused:     Physically Abused:     Sexually Abused:         Family History:  Family History   Problem Relation Age of Onset    Deep Vein Thrombosis Mother     Heart Attack Mother     Cancer Father         Allergies:   Allergies   Allergen Reactions    Other Medication Hives and Other (comments)     SEAFOOD - \"throat closes\"    Seafood Hives    Sulfa (Sulfonamide Antibiotics) Hives and Other (comments)     \"Throat closes\"        Current Medications:  Current Outpatient Medications   Medication Sig Dispense Refill    pregabalin (LYRICA) 100 mg capsule TAKE 1 CAPSULE BY MOUTH EVERY EVENING      cetirizine HCl (ZYRTEC PO) Take 10 mg by mouth nightly.  multivitamin (ONE A DAY) tablet Take 1 Tab by mouth daily. Review of systems:  Review of Systems   Constitutional: Negative for malaise/fatigue. Respiratory: Positive for shortness of breath. Cardiovascular: Positive for chest pain and palpitations. Negative for orthopnea, claudication, leg swelling and PND. Gastrointestinal: Negative for blood in stool. Musculoskeletal: Negative for falls and myalgias. Wt Readings from Last 3 Encounters:   20 74.4 kg (164 lb)   20 (P) 75.8 kg (167 lb)   20 75.3 kg (166 lb)     BP Readings from Last 3 Encounters:   20 120/84   20 121/79   20 122/78     Pulse Readings from Last 3 Encounters:   20 81   20 (!) 108   10/21/20 87     10/21/20    ECHO ADULT COMPLETE 2020    Interpretation Summary  · LV: Estimated LVEF is 55 - 60%. Visually measured ejection fraction. Normal cavity size, wall thickness and systolic function (ejection fraction normal). Wall motion: normal. Age-appropriate left ventricular diastolic function. · PA: Pulmonary arterial systolic pressure is 20 mmHg. · MV: Mild mitral valve regurgitation is present. · IVC: Mildly elevated central venous pressure (5-10 mmHg); IVC diameter is less than 21 mm and collapses less than 50% with respiration. Signed by: Nicho Harman MD on 2020 12:41 PM      EK.8.21: Ekg performed. Cardiologist to read. Physical Exam:  Physical Exam  Constitutional:       Appearance: Normal appearance. HENT:      Head: Normocephalic and atraumatic. Eyes:      Extraocular Movements: Extraocular movements intact. Pupils: Pupils are equal, round, and reactive to light.    Cardiovascular:      Rate and Rhythm: Normal rate and regular rhythm. Heart sounds: No murmur heard. No friction rub. No gallop. Pulmonary:      Effort: Pulmonary effort is normal.      Breath sounds: Normal breath sounds. No wheezing, rhonchi or rales. Chest:      Chest wall: No tenderness. Abdominal:      General: Bowel sounds are normal.   Musculoskeletal:         General: Normal range of motion. Cervical back: Normal range of motion and neck supple. Right lower leg: No edema. Left lower leg: No edema. Skin:     General: Skin is warm and dry. Neurological:      Mental Status: She is alert and oriented to person, place, and time. Labs  Lab Results   Component Value Date/Time    WBC 6.8 11/12/2020 10:58 AM    HGB 12.4 11/12/2020 10:58 AM    HCT 38.2 11/12/2020 10:58 AM    PLATELET 443 67/12/8346 10:58 AM    MCV 86 11/12/2020 10:58 AM     Lab Results   Component Value Date/Time    Sodium 142 11/12/2020 10:58 AM    Potassium 4.1 11/12/2020 10:58 AM    Chloride 103 11/12/2020 10:58 AM    CO2 29 11/12/2020 10:58 AM    Anion gap 10.0 11/12/2020 10:58 AM    Glucose 91 11/12/2020 10:58 AM    BUN 12 11/12/2020 10:58 AM    Creatinine 0.7 11/12/2020 10:58 AM    Calcium 9.7 11/12/2020 10:58 AM       Impression/Plan:    1. Palpitations/irregular heart rate/elevated hr in the 130s  - Holter monitor in October 2020, sinus tachycardia with rare ectopic beats.  - Obtain 14 day event monitor for further evaluation of palps and irregular hr.   - STOP beta-blocker for now until primary cardiologist can provide further recommendations pending event monitor results. 2.  History of atypical chest pain  - No epicardial CAD noted on cardiac cath in Nov. 2020    3. Asthma    4. Shellfish allergy    5. Family history of CAD and DVT    Office to follow up via telephone to review event monitor results when available. Follow up with Dr. Reed Moctezuma as scheduled.    Patient encouraged to follow up sooner if symptoms worsen or fail to improve. Thank you for allowing me to participate in the care of your patient. Please do not hesitate to call with questions or concerns.      Iliana Cutler NP

## 2021-06-08 NOTE — PATIENT INSTRUCTIONS
Plan: - STOP metoprolol due to borderline low blood pressures and rare ectopic beats found on previous Holter monitor results per Dr. Hollis Hernandez recommendations. - Obtain event monitor to further evaluate irregular heart rate. - Office to follow up via telephone re: results. - Follow up with Dr. Hollis Hernandez as scheduled. -  Patient encouraged to follow up sooner if symptoms worsen or fail to improve.

## 2021-07-13 ENCOUNTER — TELEPHONE (OUTPATIENT)
Dept: CARDIOLOGY CLINIC | Age: 56
End: 2021-07-13

## 2021-07-13 NOTE — TELEPHONE ENCOUNTER
Tuesday July 13, 2021  1:30PM     Holter monitor results reviewed. Preliminary findings:  - 10, 551 PACs with PAC burden of 1%.   - 96,834 PVCs with PVC burden of 2%. Patient informed of results. Patient scheduled to follow up with Dr. Tatianna Hilliard to discuss treatment options.      Kiana Padilla NP

## 2021-07-22 DIAGNOSIS — R00.2 PALPITATIONS: ICD-10-CM

## 2021-07-22 DIAGNOSIS — I49.9 IRREGULARLY IRREGULAR PULSE RHYTHM: ICD-10-CM

## 2021-08-11 ENCOUNTER — OFFICE VISIT (OUTPATIENT)
Dept: CARDIOLOGY CLINIC | Age: 56
End: 2021-08-11
Payer: COMMERCIAL

## 2021-08-11 VITALS
DIASTOLIC BLOOD PRESSURE: 78 MMHG | BODY MASS INDEX: 29.81 KG/M2 | OXYGEN SATURATION: 97 % | WEIGHT: 162 LBS | HEART RATE: 76 BPM | SYSTOLIC BLOOD PRESSURE: 120 MMHG | HEIGHT: 62 IN

## 2021-08-11 DIAGNOSIS — R00.2 PALPITATIONS: Primary | ICD-10-CM

## 2021-08-11 DIAGNOSIS — R07.9 CHEST PAIN, UNSPECIFIED TYPE: ICD-10-CM

## 2021-08-11 DIAGNOSIS — Z86.16 HISTORY OF COVID-19: ICD-10-CM

## 2021-08-11 DIAGNOSIS — R93.1 ABNORMAL ECHOCARDIOGRAM: ICD-10-CM

## 2021-08-11 DIAGNOSIS — Z82.49 FAMILY HISTORY OF EARLY CAD: ICD-10-CM

## 2021-08-11 DIAGNOSIS — R94.39 ABNORMAL STRESS TEST: ICD-10-CM

## 2021-08-11 DIAGNOSIS — I49.3 PVC (PREMATURE VENTRICULAR CONTRACTION): ICD-10-CM

## 2021-08-11 PROCEDURE — 93000 ELECTROCARDIOGRAM COMPLETE: CPT | Performed by: INTERNAL MEDICINE

## 2021-08-11 PROCEDURE — 99215 OFFICE O/P EST HI 40 MIN: CPT | Performed by: INTERNAL MEDICINE

## 2021-08-11 RX ORDER — DILTIAZEM HYDROCHLORIDE 120 MG/1
120 CAPSULE, COATED, EXTENDED RELEASE ORAL DAILY
Qty: 30 CAPSULE | Refills: 6 | Status: SHIPPED | OUTPATIENT
Start: 2021-08-11 | End: 2021-08-19 | Stop reason: SINTOL

## 2021-08-11 RX ORDER — AMOXICILLIN 500 MG/1
CAPSULE ORAL
COMMUNITY
Start: 2021-08-01 | End: 2022-01-11 | Stop reason: ALTCHOICE

## 2021-08-11 NOTE — PROGRESS NOTES
Sandra Nair presents today for   Chief Complaint   Patient presents with    Follow-up     follow up per zeina Nair preferred language for health care discussion is english/other. Is someone accompanying this pt? no    Is the patient using any DME equipment during [address]? no    Depression Screening:  3 most recent PHQ Screens 8/11/2021   Little interest or pleasure in doing things Not at all   Feeling down, depressed, irritable, or hopeless Not at all   Total Score PHQ 2 0       Learning Assessment:  Learning Assessment 8/11/2021   PRIMARY LEARNER Patient   PRIMARY LANGUAGE ENGLISH   LEARNER PREFERENCE PRIMARY DEMONSTRATION   ANSWERED BY patient   RELATIONSHIP SELF       Abuse Screening:  Abuse Screening Questionnaire 8/11/2021   Do you ever feel afraid of your partner? N   Are you in a relationship with someone who physically or mentally threatens you? N   Is it safe for you to go home? Y       Fall Risk  Fall Risk Assessment, last 12 mths 12/9/2020   Able to walk? Yes   Fall in past 12 months? No           Pt currently taking Anticoagulant therapy? no    Pt currently taking Antiplatelet therapy ? no      Coordination of Care:  1. Have you been to the ER, urgent care clinic since your last visit? Hospitalized since your last visit? no    2. Have you seen or consulted any other health care providers outside of the [address] [place] [address] since your last visit? Include any pap smears or colon screening.  no
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
## 2021-08-17 ENCOUNTER — TELEPHONE (OUTPATIENT)
Dept: CARDIOLOGY CLINIC | Age: 56
End: 2021-08-17

## 2021-08-19 NOTE — TELEPHONE ENCOUNTER
Mrs. Dena Donnelly called stating since starting the cardizem it has made her feel horrible. Reviewed patient last office note and Dr. Sara Stover stated he she begins to feel poorly she can stop. Patient agreed with this and she will discontinue the medication.

## 2021-12-08 ENCOUNTER — OFFICE VISIT (OUTPATIENT)
Dept: CARDIOLOGY CLINIC | Age: 56
End: 2021-12-08
Payer: COMMERCIAL

## 2021-12-08 VITALS
WEIGHT: 164 LBS | HEIGHT: 62 IN | BODY MASS INDEX: 30.18 KG/M2 | DIASTOLIC BLOOD PRESSURE: 64 MMHG | HEART RATE: 76 BPM | SYSTOLIC BLOOD PRESSURE: 106 MMHG | OXYGEN SATURATION: 97 %

## 2021-12-08 DIAGNOSIS — R07.9 CHEST PAIN, UNSPECIFIED TYPE: ICD-10-CM

## 2021-12-08 DIAGNOSIS — I49.3 PVC (PREMATURE VENTRICULAR CONTRACTION): Primary | ICD-10-CM

## 2021-12-08 DIAGNOSIS — J45.909 UNCOMPLICATED ASTHMA, UNSPECIFIED ASTHMA SEVERITY, UNSPECIFIED WHETHER PERSISTENT: ICD-10-CM

## 2021-12-08 DIAGNOSIS — R00.2 PALPITATIONS: ICD-10-CM

## 2021-12-08 DIAGNOSIS — Z86.16 HISTORY OF COVID-19: ICD-10-CM

## 2021-12-08 DIAGNOSIS — R93.1 ABNORMAL ECHOCARDIOGRAM: ICD-10-CM

## 2021-12-08 PROCEDURE — 99214 OFFICE O/P EST MOD 30 MIN: CPT | Performed by: INTERNAL MEDICINE

## 2021-12-08 PROCEDURE — 93000 ELECTROCARDIOGRAM COMPLETE: CPT | Performed by: INTERNAL MEDICINE

## 2021-12-08 NOTE — PROGRESS NOTES
Gia Driver presents today for   Chief Complaint   Patient presents with    Follow-up     1 year      Shortness of Breath     exertional     Chest Pain     intermittent sharp    Palpitations     racing        Gia Driver preferred language for health care discussion is english/other. Is someone accompanying this pt? no    Is the patient using any DME equipment during [address]? no    Depression Screening:  3 most recent PHQ Screens 8/11/2021   Little interest or pleasure in doing things Not at all   Feeling down, depressed, irritable, or hopeless Not at all   Total Score PHQ 2 0       Learning Assessment:  Learning Assessment 8/11/2021   PRIMARY LEARNER Patient   PRIMARY LANGUAGE ENGLISH   LEARNER PREFERENCE PRIMARY DEMONSTRATION   ANSWERED BY patient   RELATIONSHIP SELF       Abuse Screening:  Abuse Screening Questionnaire 8/11/2021   Do you ever feel afraid of your partner? N   Are you in a relationship with someone who physically or mentally threatens you? N   Is it safe for you to go home? Y       Fall Risk  Fall Risk Assessment, last 12 mths 12/9/2020   Able to walk? Yes   Fall in past 12 months? No       Pt currently taking Anticoagulant therapy? no    Coordination of Care:  1. Have you been to the ER, urgent care clinic since your last visit? Hospitalized since your last visit? no    2. Have you seen or consulted any other health care providers outside of the [address] [place] [address] since your last visit? Include any pap smears or colon screening.  no

## 2021-12-08 NOTE — PROGRESS NOTES
History of Present Illness:  64year old female here for follow up. I last saw her August, 2021. She was seen December, 2020 prior. She had abnormal stress test and heart catheterization without epicardial disease. She was having palpitations. She had COVID infection in December and ever since then she has had intermittent palpitations, atypical chest pain, general fatigue. Her asthma seems stable. Her symptoms have not changed significantly. She had an event monitor placed with PACs and PVCs, approximately 1-2%, prior to that follow up echocardiogram in September showed normal EF. No pulmonary hypertension. Impression:  1. History of palpitations, worse since COVID infection December, 2020, multifocal, premature atrial and ventricular ectopic beats, not more than 1-2% by event monitor earlier this year. 2. History of atypical chest pain, abnormal stress test November, 2020 with subsequent heart cath without epicardial disease. 3. Echo September, 2021 with normal EF, no pulmonary hypertension. 4. History of COVID infection December, 2020, for which she appears to be a long hauler with ongoing palpitations, fatigue and chest pain. 5. History of asthma, on inhalers. 6. Strong family history of heart disease, as well as remote DVT. Plan:  She continues to have intermittent atypical chest pain, palpitations, minimally changed in intensity, severity and frequency from her last event monitor. Her ectopy was multifocal on her last event monitor, so I would be hesitant to offer her an ablation. Based upon the morphology today as well, they appear to be left sided. I would like to see back in about six months and if they are persisting or increasing, it may be reasonable to obtain a monitor again, and if they are increasing in frequency, ablation would be a reasonable option, but overall at this point low success.       Past Medical History:   Diagnosis Date    Asthma     Nerve damage     Shingles Current Outpatient Medications   Medication Sig Dispense Refill    amoxicillin (AMOXIL) 500 mg capsule TAKE 1 CAPSULE BY MOUTH THREE TIMES A DAY FOR 10 DAYS      albuterol (PROVENTIL HFA, VENTOLIN HFA, PROAIR HFA) 90 mcg/actuation inhaler Take 2 Puffs by inhalation.  fluticasone propionate (Flovent HFA) 110 mcg/actuation inhaler INHALE 2 PUFFS BY MOUTH TWICE A DAY      pregabalin (LYRICA) 100 mg capsule TAKE 1 CAPSULE BY MOUTH EVERY EVENING      cetirizine HCl (ZYRTEC PO) Take 10 mg by mouth nightly. Social History   reports that she has never smoked. She has never used smokeless tobacco.   reports no history of alcohol use. Family History  family history includes Cancer in her father; Deep Vein Thrombosis in her mother; Heart Attack in her mother. Review of Systems  Except as stated above include:  Constitutional: Negative for fever, chills and malaise/fatigue. HEENT: No congestion or recent URI. Gastrointestinal: No nausea, vomiting, abdominal pain, bloody stools. Pulmonary:  Negative except as stated above. Cardiac:  Negative except as stated above. Musculoskeletal: Negative except as stated above. Neurological:  No localized symptoms. Skin:  Negative except as stated above. Psych:  Negative except as stated above. Endocrine:  Negative except as stated above. PHYSICAL EXAM  BP Readings from Last 3 Encounters:   12/08/21 106/64   09/15/21 120/78   08/11/21 120/78     Pulse Readings from Last 3 Encounters:   12/08/21 76   08/11/21 76   06/08/21 78     Wt Readings from Last 3 Encounters:   12/08/21 74.4 kg (164 lb)   09/15/21 73.5 kg (162 lb)   08/11/21 73.5 kg (162 lb)     General:   Well developed, well groomed. Head/Neck:   No obvious jugular venous distention     No obvious carotid pulsations. No evidence of xanthelasma. Lungs:   No respiratory distress. Clear bilaterally. Heart:  Regular rate and rhythm. Normal S1/S2.       Palpation grossly normal.    No significant murmurs, rubs or gallops. Abdomen:   Non-acute abdomen. No obvious pulsations. Extremities:   Intact peripheral pulses. No significant edema. Neurological:   Alert and oriented to person, place, time. No focal neurological deficit visually. Skin:   No obvious rash    Blood Pressure Metric:  Monitor recommended and adjustments stated if needed.

## 2022-01-11 ENCOUNTER — OFFICE VISIT (OUTPATIENT)
Dept: ORTHOPEDIC SURGERY | Age: 57
End: 2022-01-11
Payer: COMMERCIAL

## 2022-01-11 VITALS
BODY MASS INDEX: 30.36 KG/M2 | OXYGEN SATURATION: 98 % | WEIGHT: 165 LBS | HEART RATE: 94 BPM | HEIGHT: 62 IN | TEMPERATURE: 96.9 F

## 2022-01-11 DIAGNOSIS — M62.838 MUSCLE SPASM: ICD-10-CM

## 2022-01-11 DIAGNOSIS — B02.29 POST HERPETIC NEURALGIA: ICD-10-CM

## 2022-01-11 DIAGNOSIS — M47.816 LUMBAR FACET ARTHROPATHY: Primary | ICD-10-CM

## 2022-01-11 DIAGNOSIS — M51.36 DDD (DEGENERATIVE DISC DISEASE), LUMBAR: ICD-10-CM

## 2022-01-11 PROCEDURE — 99203 OFFICE O/P NEW LOW 30 MIN: CPT | Performed by: PHYSICAL MEDICINE & REHABILITATION

## 2022-01-11 RX ORDER — LIDOCAINE 50 MG/G
1 PATCH TOPICAL EVERY 24 HOURS
Qty: 30 EACH | Refills: 2 | Status: SHIPPED | OUTPATIENT
Start: 2022-01-11

## 2022-01-11 NOTE — PATIENT INSTRUCTIONS
Low Back Arthritis: Exercises  Introduction  Here are some examples of typical rehabilitation exercises for your condition. Start each exercise slowly. Ease off the exercise if you start to have pain. Your doctor or physical therapist will tell you when you can start these exercises and which ones will work best for you. When you are not being active, find a comfortable position for rest. Some people are comfortable on the floor or a medium-firm bed with a small pillow under their head and another under their knees. Some people prefer to lie on their side with a pillow between their knees. Don't stay in one position for too long. Take short walks (10 to 20 minutes) every 2 to 3 hours. Avoid slopes, hills, and stairs until you feel better. Walk only distances you can manage without pain, especially leg pain. How to do the exercises  Pelvic tilt    1. Lie on your back with your knees bent. 2. \"Brace\" your stomach--tighten your muscles by pulling in and imagining your belly button moving toward your spine. 3. Press your lower back into the floor. You should feel your hips and pelvis rock back. 4. Hold for 6 seconds while breathing smoothly. 5. Relax and allow your pelvis and hips to rock forward. 6. Repeat 8 to 12 times. Back stretches    1. Get down on your hands and knees on the floor. 2. Relax your head and allow it to droop. Round your back up toward the ceiling until you feel a nice stretch in your upper, middle, and lower back. Hold this stretch for as long as it feels comfortable, or about 15 to 30 seconds. 3. Return to the starting position with a flat back while you are on your hands and knees. 4. Let your back sway by pressing your stomach toward the floor. Lift your buttocks toward the ceiling. 5. Hold this position for 15 to 30 seconds. 6. Repeat 2 to 4 times. Follow-up care is a key part of your treatment and safety.  Be sure to make and go to all appointments, and call your doctor if you are having problems. It's also a good idea to know your test results and keep a list of the medicines you take. Where can you learn more? Go to http://www.[employer].com/  Enter T094 in the search box to learn more about \"Low Back Arthritis: Exercises. \"  Current as of: July 1, 2021               Content Version: 13.0  © 4009-1651 [employer]. Care instructions adapted under license by [employer] (which disclaims liability or warranty for this information). If you have questions about a medical condition or this instruction, always ask your healthcare professional. [place] [address] any warranty or liability for your use of this information.

## 2022-01-11 NOTE — PROGRESS NOTES
Laly Perez presents today for   Chief Complaint   Patient presents with    Back Pain       Is someone accompanying this pt? no    Is the patient using any DME equipment during OV? no    Depression Screening:  3 most recent PHQ Screens 1/11/2022   Little interest or pleasure in doing things Not at all   Feeling down, depressed, irritable, or hopeless Not at all   Total Score PHQ 2 0       Learning Assessment:  Learning Assessment 8/11/2021   PRIMARY LEARNER Patient   PRIMARY LANGUAGE ENGLISH   LEARNER PREFERENCE PRIMARY DEMONSTRATION   ANSWERED BY patient   RELATIONSHIP SELF       Abuse Screening:  Abuse Screening Questionnaire 8/11/2021   Do you ever feel afraid of your partner? N   Are you in a relationship with someone who physically or mentally threatens you? N   Is it safe for you to go home? Y       Fall Risk  Fall Risk Assessment, last 12 mths 12/9/2020   Able to walk? Yes   Fall in past 12 months? No       Coordination of Care:  1. Have you been to the ER, urgent care clinic since your last visit? no  Hospitalized since your last visit? no    2. Have you seen or consulted any other health care providers outside of the [address] [place] [address] since your last visit? Yes, pcp and cardiology Include any pap smears or colon screening.  no

## 2022-08-17 ENCOUNTER — OFFICE VISIT (OUTPATIENT)
Dept: CARDIOLOGY CLINIC | Age: 57
End: 2022-08-17
Payer: COMMERCIAL

## 2022-08-17 VITALS
SYSTOLIC BLOOD PRESSURE: 116 MMHG | WEIGHT: 167 LBS | BODY MASS INDEX: 30.73 KG/M2 | HEIGHT: 62 IN | HEART RATE: 97 BPM | DIASTOLIC BLOOD PRESSURE: 70 MMHG | OXYGEN SATURATION: 97 %

## 2022-08-17 DIAGNOSIS — R07.9 CHEST PAIN, UNSPECIFIED TYPE: ICD-10-CM

## 2022-08-17 DIAGNOSIS — I49.3 PVC (PREMATURE VENTRICULAR CONTRACTION): ICD-10-CM

## 2022-08-17 DIAGNOSIS — R00.2 PALPITATIONS: Primary | ICD-10-CM

## 2022-08-17 DIAGNOSIS — I49.9 IRREGULARLY IRREGULAR PULSE RHYTHM: ICD-10-CM

## 2022-08-17 DIAGNOSIS — R94.39 ABNORMAL STRESS TEST: ICD-10-CM

## 2022-08-17 PROCEDURE — 99214 OFFICE O/P EST MOD 30 MIN: CPT | Performed by: INTERNAL MEDICINE

## 2022-08-17 PROCEDURE — 93000 ELECTROCARDIOGRAM COMPLETE: CPT | Performed by: INTERNAL MEDICINE

## 2022-08-17 NOTE — PROGRESS NOTES
History of Present Illness:  62 YOF here for follow up. She had left ankle surgery in March. Subsequently she has not been very active. She has had some intermittent chest pain, 1-2 minutes, not necessarily relieved or exacerbated with exertion or rest.  She has also had increasing palpitations, not prolonged. No syncope. Asthma is reasonably controlled. No PND, orthopnea or edema. Impression:  Recent increase in chest pain with poor functional status due to recent ankle surgery. Last stress test November 2020 with subsequent heart cath without epicardial disease. Echo September 2021 with normal EF. Palpitations, increasing. COVID infection December 2020 with history of PACs and PVCs by event monitor at that time, not more than 1-2%. Asthma, stable. Strong family history of heart disease, as well as DVT. Plan:  She is recovering from her ankle surgery, but is somewhat sedentary. She is having more atypical chest pain, although she had a stress test and heart cath a couple years ago. I would like to repeat a stress test.  I am also going to obtain a follow up event monitor for a couple weeks. If there is increasing ectopy, consider low dose calcium channel blocker. I would be hesitant to use beta blocker due to asthma. All questions answered. Past Medical History:   Diagnosis Date    Asthma     Nerve damage     Shingles        Current Outpatient Medications   Medication Sig Dispense Refill    lidocaine (LIDODERM) 5 % 1 Patch by TransDERmal route every twenty-four (24) hours. Apply patch to the affected area for 12 hours a day and remove for 12 hours a day. 30 Each 2    albuterol (PROVENTIL HFA, VENTOLIN HFA, PROAIR HFA) 90 mcg/actuation inhaler Take 2 Puffs by inhalation. pregabalin (LYRICA) 100 mg capsule TAKE 1 CAPSULE BY MOUTH EVERY EVENING      cetirizine HCl (ZYRTEC PO) Take 10 mg by mouth nightly. Social History   reports that she has never smoked.  She has never used smokeless tobacco.   reports no history of alcohol use. Family History  family history includes Cancer in her father; Deep Vein Thrombosis in her mother; Heart Attack in her mother. Review of Systems  Except as stated above include:  Constitutional: Negative for fever, chills and malaise/fatigue. HEENT: No congestion or recent URI. Gastrointestinal: No nausea, vomiting, abdominal pain, bloody stools. Pulmonary:  Negative except as stated above. Cardiac:  Negative except as stated above. Musculoskeletal: Negative except as stated above. Neurological:  No localized symptoms. Skin:  Negative except as stated above. Psych:  Negative except as stated above. Endocrine:  Negative except as stated above. PHYSICAL EXAM  BP Readings from Last 3 Encounters:   08/17/22 116/70   12/08/21 106/64   09/15/21 120/78     Pulse Readings from Last 3 Encounters:   08/17/22 97   01/11/22 94   12/08/21 76     Wt Readings from Last 3 Encounters:   08/17/22 75.8 kg (167 lb)   01/11/22 74.8 kg (165 lb)   12/08/21 74.4 kg (164 lb)     General:   Well developed, well groomed. Head/Neck:   No obvious jugular venous distention     No obvious carotid pulsations. No evidence of xanthelasma. Lungs:   No respiratory distress. Clear bilaterally. Heart:  Regular rate and rhythm. Normal S1/S2. Palpation grossly normal.    No significant murmurs, rubs or gallops. Abdomen:   Non-acute abdomen. No obvious pulsations. Extremities:   Intact peripheral pulses. No significant edema. Neurological:   Alert and oriented to person, place, time. No focal neurological deficit visually. Skin:   No obvious rash    Blood Pressure Metric:  Monitor recommended and adjustments stated if needed.

## 2022-08-17 NOTE — PROGRESS NOTES
Anthony Miller presents today for   Chief Complaint   Patient presents with    Follow-up     6 months        Anthony Miller preferred language for health care discussion is english/other. Is someone accompanying this pt? no    Is the patient using any DME equipment during [address]? no    Depression Screening:  3 most recent PHQ Screens 1/11/2022   Little interest or pleasure in doing things Not at all   Feeling down, depressed, irritable, or hopeless Not at all   Total Score PHQ 2 0       Learning Assessment:  Learning Assessment 8/11/2021   PRIMARY LEARNER Patient   PRIMARY LANGUAGE ENGLISH   LEARNER PREFERENCE PRIMARY DEMONSTRATION   ANSWERED BY patient   RELATIONSHIP SELF       Abuse Screening:  Abuse Screening Questionnaire 8/11/2021   Do you ever feel afraid of your partner? N   Are you in a relationship with someone who physically or mentally threatens you? N   Is it safe for you to go home? Y       Fall Risk  Fall Risk Assessment, last 12 mths 12/9/2020   Able to walk? Yes   Fall in past 12 months? No       Pt currently taking Anticoagulant therapy? no    Coordination of Care:  1. Have you been to the ER, urgent care clinic since your last visit? Hospitalized since your last visit? no    2. Have you seen or consulted any other health care providers outside of the [address] [place] [address] since your last visit? Include any pap smears or colon screening.  no

## 2022-08-19 ENCOUNTER — TELEPHONE (OUTPATIENT)
Dept: CARDIOLOGY CLINIC | Age: 57
End: 2022-08-19

## 2022-08-31 ENCOUNTER — TELEPHONE (OUTPATIENT)
Dept: ORTHOPEDIC SURGERY | Age: 57
End: 2022-08-31

## 2022-09-16 ENCOUNTER — PATIENT MESSAGE (OUTPATIENT)
Dept: CARDIOLOGY CLINIC | Age: 57
End: 2022-09-16

## 2022-09-27 NOTE — TELEPHONE ENCOUNTER
----- Message from Adriano River RN sent at 9/19/2022  6:53 AM EDT -----  Regarding: FW: Heart monitor results     ----- Message -----  From: Vicky Haskins  Sent: 9/16/2022   8:18 PM EDT  To: Mayela Ann  Subject: Heart monitor results                            Good evening. Just checking to see if you received the results from my heart monitor. [place].  [phone]

## 2022-09-27 NOTE — TELEPHONE ENCOUNTER
Called pt to let them know that Dr. Sy Pruitt MD read event monitor test results and they were as follows:     Sinus rhythm with PAC/PVC about 1%    Called pt and she had no additional questions

## 2022-11-21 ENCOUNTER — TELEPHONE (OUTPATIENT)
Dept: CARDIOLOGY CLINIC | Age: 57
End: 2022-11-21

## 2022-11-22 NOTE — TELEPHONE ENCOUNTER
Pt called yesterday afternoon because she was concerned again because she felt like her heart rate was going up at times, and she was worried that she may be having atrial fib. Looked back at [employer] event monitor results, and that was in [place] and did not show any afib. Pt volunteered that she had experienced some chest \"squeezing over the weekend\" and her family was concerned for her, but she said that she chose not to go to the ER and to \"sit it out\" and she said she fell asleep, and when she woke up it was gone. Firstly, told pt that if that type of pain returns again, she really should not just wait it out, that she really does need to go to the ER because one never knows when this is happening if it is going to be serious or non-serious, and she agreed with this plan. Also , did reassure her, that on the monitor when she felt \"chest pain\", her HR was elevated above 100, but no afib. Pt said that she would like to have a sleep study too because her and Dr Kimberly Ozuna had talked about that too. Offered her an earlier appt, and she accepted for December 21. We will see her then, and if she experiences any more chest discomfort, pt agrees to go to the ER.

## 2022-12-01 ENCOUNTER — TELEPHONE (OUTPATIENT)
Dept: CARDIOLOGY CLINIC | Age: 57
End: 2022-12-01

## 2022-12-06 NOTE — TELEPHONE ENCOUNTER
Reviewed with Dr Malina Valdez in office phone conversation with patient.  No new orders or plans, will see in office 12/21

## 2022-12-21 ENCOUNTER — OFFICE VISIT (OUTPATIENT)
Dept: CARDIOLOGY CLINIC | Age: 57
End: 2022-12-21
Payer: COMMERCIAL

## 2022-12-21 VITALS
SYSTOLIC BLOOD PRESSURE: 128 MMHG | BODY MASS INDEX: 32.57 KG/M2 | DIASTOLIC BLOOD PRESSURE: 80 MMHG | HEIGHT: 62 IN | OXYGEN SATURATION: 97 % | WEIGHT: 177 LBS | HEART RATE: 80 BPM

## 2022-12-21 DIAGNOSIS — Z86.16 HISTORY OF COVID-19: ICD-10-CM

## 2022-12-21 DIAGNOSIS — I49.3 PVC (PREMATURE VENTRICULAR CONTRACTION): ICD-10-CM

## 2022-12-21 DIAGNOSIS — R07.9 CHEST PAIN, UNSPECIFIED TYPE: Primary | ICD-10-CM

## 2022-12-21 DIAGNOSIS — R00.2 PALPITATIONS: ICD-10-CM

## 2022-12-21 DIAGNOSIS — I49.9 IRREGULARLY IRREGULAR PULSE RHYTHM: ICD-10-CM

## 2022-12-21 RX ORDER — CARVEDILOL 3.12 MG/1
3.12 TABLET ORAL 2 TIMES DAILY WITH MEALS
Qty: 60 TABLET | Refills: 6 | Status: SHIPPED | OUTPATIENT
Start: 2022-12-21

## 2022-12-21 RX ORDER — GUAIFENESIN 100 MG/5ML
81 LIQUID (ML) ORAL DAILY
COMMUNITY

## 2022-12-21 NOTE — PROGRESS NOTES
Dictation on: 12/21/2022  8:24 AM by: Nell Chambers       Past Medical History:   Diagnosis Date    Asthma     Nerve damage     Shingles        Current Outpatient Medications   Medication Sig Dispense Refill    aspirin 81 mg chewable tablet Take 81 mg by mouth daily. lidocaine (LIDODERM) 5 % 1 Patch by TransDERmal route every twenty-four (24) hours. Apply patch to the affected area for 12 hours a day and remove for 12 hours a day. 30 Each 2    albuterol (PROVENTIL HFA, VENTOLIN HFA, PROAIR HFA) 90 mcg/actuation inhaler Take 2 Puffs by inhalation as needed. pregabalin (LYRICA) 100 mg capsule TAKE 1 CAPSULE BY MOUTH EVERY EVENING      cetirizine HCl (ZYRTEC PO) Take 10 mg by mouth nightly. Social History   reports that she has never smoked. She has never used smokeless tobacco.   reports no history of alcohol use. Family History  family history includes Cancer in her father; Deep Vein Thrombosis in her mother; Heart Attack in her mother. Review of Systems  Except as stated above include:  Constitutional: Negative for fever, chills and malaise/fatigue. HEENT: No congestion or recent URI. Gastrointestinal: No nausea, vomiting, abdominal pain, bloody stools. Pulmonary:  Negative except as stated above. Cardiac:  Negative except as stated above. Musculoskeletal: Negative except as stated above. Neurological:  No localized symptoms. Skin:  Negative except as stated above. Psych:  Negative except as stated above. Endocrine:  Negative except as stated above. PHYSICAL EXAM  BP Readings from Last 3 Encounters:   12/21/22 128/80   08/22/22 122/80   08/17/22 116/70     Pulse Readings from Last 3 Encounters:   12/21/22 80   08/17/22 97   01/11/22 94     Wt Readings from Last 3 Encounters:   12/21/22 80.3 kg (177 lb)   08/22/22 75.8 kg (167 lb)   08/17/22 75.8 kg (167 lb)     General:   Well developed, well groomed.     Head/Neck:   No obvious jugular venous distention     No obvious carotid pulsations. No evidence of xanthelasma. Lungs:   No respiratory distress. Clear bilaterally. Heart:  Regular rate and rhythm. Normal S1/S2. Palpation grossly normal.    No significant murmurs, rubs or gallops. Abdomen:   Non-acute abdomen. No obvious pulsations. Extremities:   Intact peripheral pulses. No significant edema. Neurological:   Alert and oriented to person, place, time. No focal neurological deficit visually. Skin:   No obvious rash    Blood Pressure Metric:  Monitor recommended and adjustments stated if needed.

## 2022-12-21 NOTE — PROGRESS NOTES
Mandy Yu presents today for   Chief Complaint   Patient presents with    Follow-up     3 month    Chest Pain     Tightness in the left side of chest    Palpitations     racing       Mandy Yu preferred language for health care discussion is english/other. Is someone accompanying this pt? no    Is the patient using any DME equipment during [address]? no    Depression Screening:  3 most recent PHQ Screens 12/21/2022   Little interest or pleasure in doing things Not at all   Feeling down, depressed, irritable, or hopeless Not at all   Total Score PHQ 2 0       Learning Assessment:  Learning Assessment 12/21/2022   PRIMARY LEARNER Patient   PRIMARY LANGUAGE ENGLISH   LEARNER PREFERENCE PRIMARY DEMONSTRATION   ANSWERED BY patient   RELATIONSHIP SELF       Abuse Screening:  Abuse Screening Questionnaire 12/21/2022   Do you ever feel afraid of your partner? N   Are you in a relationship with someone who physically or mentally threatens you? N   Is it safe for you to go home? Y       Fall Risk  Fall Risk Assessment, last 12 mths 12/9/2020   Able to walk? Yes   Fall in past 12 months? No           Pt currently taking Anticoagulant therapy? no    Pt currently taking Antiplatelet therapy ? no      Coordination of Care:  1. Have you been to the ER, urgent care clinic since your last visit? Hospitalized since your last visit? no    2. Have you seen or consulted any other health care providers outside of the [address] [place] [address] since your last visit? Include any pap smears or colon screening.  no

## 2022-12-21 NOTE — PATIENT INSTRUCTIONS
Follow up with Dr. Akanksha Vee in 3-4 weeks  ECHO  CT scan of Chest - If you have not heard from the central scheduler to schedule your testing in 48 hours, please call [phone].      Start Carvedilol ( Coreg ) 3.125mg twice a day

## 2022-12-21 NOTE — PROGRESS NOTES
History of Present Illness:  62 YOF here for follow up, as well as chest pain and palpitations. She continues to have intermittent left sided chest pain lasting a few minutes, not always exertional.  Asthma is controlled. No nausea, vomiting. No significant dyspnea. 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 Impression:  Ongoing and increasing chest pain, atypical.  Heart cath November 2020 with abnormal stress test, but heart cath without epicardial disease. Nuclear stress test August 2022 without ischemia. Palpitations with history of PACs and PVCs by event monitor September 2022. COVID infection December 2020. History of asthma. Strong family history of heart disease, as well as DVT. Plan:  Her chest pain is atypical and with heart cath a couple years ago and recent stress test, I have low concern for cardiac ischemia. I am going to obtain an echocardiogram, however, to make sure there is no evidence of pulmonary hypertension, valve disease. I also would like to get a chest CT to rule out PE given the atypical symptoms, rather sedentary lifestyle over the past year or two due to ankle surgery, as well as family history of DVT. I am also going to start low dose Coreg 3.125 mg twice daily and if this is not helping her palpitations, I will consider Cardizem. I will see back in three to four weeks.

## 2023-01-03 ENCOUNTER — HOSPITAL ENCOUNTER (OUTPATIENT)
Dept: CT IMAGING | Age: 58
End: 2023-01-03
Attending: INTERNAL MEDICINE

## 2023-01-06 ENCOUNTER — HOSPITAL ENCOUNTER (OUTPATIENT)
Dept: CT IMAGING | Age: 58
End: 2023-01-06
Attending: INTERNAL MEDICINE
Payer: COMMERCIAL

## 2023-01-06 DIAGNOSIS — R07.9 CHEST PAIN, UNSPECIFIED TYPE: ICD-10-CM

## 2023-01-06 DIAGNOSIS — R00.2 PALPITATIONS: ICD-10-CM

## 2023-01-06 PROCEDURE — 71275 CT ANGIOGRAPHY CHEST: CPT

## 2023-01-06 PROCEDURE — 74011000636 HC RX REV CODE- 636: Performed by: INTERNAL MEDICINE

## 2023-01-06 RX ADMIN — IOPAMIDOL 80 ML: 755 INJECTION, SOLUTION INTRAVENOUS at 19:20

## 2023-01-07 NOTE — PROGRESS NOTES
PT RECEIVED 80 cc isovue 370   PT was NOT premedicated and TOLERATED iodine WELL   ( allergic to SEAFOOD and had previously been predmed for that . .. spoke to dr Satnam Washburn and she ok's injection of iodine 370 as no correlation to seafood anymore Mckenzie Garcia

## 2023-01-10 ENCOUNTER — TELEPHONE (OUTPATIENT)
Dept: CARDIOLOGY CLINIC | Age: 58
End: 2023-01-10

## 2023-01-10 NOTE — TELEPHONE ENCOUNTER
Patient would like to know what this means     \"A lingular single segmental pulmonary embolus identified. No large/central  embolus and no evidence of right heart strain. Left lower lobe with a few hazy groundglass nodules, probably  infectious/inflammatory. \"

## 2023-01-13 NOTE — TELEPHONE ENCOUNTER
Go Aguilar MD  [facility]  Caller: Unspecified (3 days ago,  1:04 PM)  Thank you, I updated primary MD, Dr. Octavio Ceja, x     Called and left message for patient to follow up with PCP for results of CTA.

## 2023-01-19 ENCOUNTER — OFFICE VISIT (OUTPATIENT)
Dept: CARDIOLOGY CLINIC | Age: 58
End: 2023-01-19

## 2023-01-19 VITALS
WEIGHT: 175 LBS | DIASTOLIC BLOOD PRESSURE: 80 MMHG | HEART RATE: 78 BPM | SYSTOLIC BLOOD PRESSURE: 106 MMHG | BODY MASS INDEX: 32.2 KG/M2 | HEIGHT: 62 IN | OXYGEN SATURATION: 98 %

## 2023-01-19 DIAGNOSIS — R00.2 PALPITATIONS: Primary | ICD-10-CM

## 2023-01-19 DIAGNOSIS — Z86.16 HISTORY OF COVID-19: ICD-10-CM

## 2023-01-19 DIAGNOSIS — I26.99 OTHER PULMONARY EMBOLISM WITHOUT ACUTE COR PULMONALE, UNSPECIFIED CHRONICITY (HCC): ICD-10-CM

## 2023-01-19 DIAGNOSIS — R07.9 CHEST PAIN, UNSPECIFIED TYPE: ICD-10-CM

## 2023-01-19 DIAGNOSIS — R31.9 HEMATURIA, UNSPECIFIED TYPE: ICD-10-CM

## 2023-01-19 DIAGNOSIS — I49.3 PVC (PREMATURE VENTRICULAR CONTRACTION): ICD-10-CM

## 2023-01-19 PROCEDURE — 99215 OFFICE O/P EST HI 40 MIN: CPT | Performed by: INTERNAL MEDICINE

## 2023-01-19 NOTE — PROGRESS NOTES
History of Present Illness:  62 YOF here for follow up. She still has intermittent chest pain. No syncope, PND, orthopnea or edema. She hurt her left toes and is wearing a brace. I performed a CT of the chest to rule out PE a few weeks ago. It did show a segmental lingular PE. She was started on Eliquis in the ER, but then developed hematuria. There is an ongoing workup and the Eliquis has been held. Impression:  Recent new PE, lingular, by CT scan this month. Hematuria with Eliquis, now stopped. She has ongoing workup with Dr. Matilde Ames. Intermittent atypical chest pain. Heart catheterization November 2020 with abnormal stress test and heart catheterization without any epicardial disease. Nuclear stress test August 2022 without ischemia. Echo January 19, 2023, reviewed today with normal EF, no effusion. RV function normal.  History of palpitations and PACs with PVCs by event monitor September 2022, stable. COVID infection December 2020. History of asthma. Plan:  I found a small lingular pulmonary embolism on her CT scan in workup for atypical chest pain that appeared to be noncardiac. She is following up closely with Dr. Matilde Ames. She was initially started on Eliquis, but then had significant hematuria with ongoing workup. I will defer to Dr. Matilde Ames for further evaluation. From a cardiac standpoint, given her palpitations, I will see back in three to four weeks. I reviewed that echocardiogram showed structurally normal heart today. Wt Readings from Last 3 Encounters:   01/19/23 79.4 kg (175 lb)   01/19/23 80.3 kg (177 lb)   12/21/22 80.3 kg (177 lb)     Past Medical History:   Diagnosis Date    Asthma     Nerve damage     Shingles        Current Outpatient Medications   Medication Sig Dispense Refill    aspirin 81 mg chewable tablet Take 81 mg by mouth daily. carvediloL (COREG) 3.125 mg tablet Take 1 Tablet by mouth two (2) times daily (with meals).  60 Tablet 6    lidocaine (LIDODERM) 5 % 1 Patch by TransDERmal route every twenty-four (24) hours. Apply patch to the affected area for 12 hours a day and remove for 12 hours a day. 30 Each 2    albuterol (PROVENTIL HFA, VENTOLIN HFA, PROAIR HFA) 90 mcg/actuation inhaler Take 2 Puffs by inhalation as needed. pregabalin (LYRICA) 100 mg capsule TAKE 1 CAPSULE BY MOUTH EVERY EVENING      cetirizine HCl (ZYRTEC PO) Take 10 mg by mouth nightly. Social History   reports that she has never smoked. She has never used smokeless tobacco.   reports no history of alcohol use. Family History  family history includes Cancer in her father; Deep Vein Thrombosis in her mother; Heart Attack in her mother. Review of Systems  Except as stated above include:  Constitutional: Negative for fever, chills and malaise/fatigue. HEENT: No congestion or recent URI. Gastrointestinal: No nausea, vomiting, abdominal pain, bloody stools. Pulmonary:  Negative except as stated above. Cardiac:  Negative except as stated above. Musculoskeletal: Negative except as stated above. Neurological:  No localized symptoms. Skin:  Negative except as stated above. Psych:  Negative except as stated above. Endocrine:  Negative except as stated above. PHYSICAL EXAM  BP Readings from Last 3 Encounters:   01/19/23 106/80   01/19/23 128/80   12/21/22 128/80     Pulse Readings from Last 3 Encounters:   01/19/23 78   12/21/22 80   08/17/22 97       General:   Well developed, well groomed. Head/Neck:   No obvious jugular venous distention     No obvious carotid pulsations. No evidence of xanthelasma. Lungs:   No respiratory distress. Clear bilaterally. Heart:  Regular rate and rhythm. Normal S1/S2. Palpation grossly normal.    No significant murmurs, rubs or gallops. Abdomen:   Non-acute abdomen. No obvious pulsations. Extremities:   Intact peripheral pulses. No significant edema. Neurological:   Alert and oriented to person, place, time. No focal neurological deficit visually. Skin:   No obvious rash    Blood Pressure Metric:  Monitor recommended and adjustments stated if needed.

## 2023-01-19 NOTE — PROGRESS NOTES
Edgar Ohara presents today for   Chief Complaint   Patient presents with    Follow-up     3-4 wk fu, after testing    Chest Pain       Edgar Ohara preferred language for health care discussion is english/other. Is someone accompanying this pt? no    Is the patient using any DME equipment during [address]? Cane and boot on left leg    Depression Screening:  3 most recent PHQ Screens 1/19/2023   Little interest or pleasure in doing things Not at all   Feeling down, depressed, irritable, or hopeless Not at all   Total Score PHQ 2 0       Learning Assessment:  Learning Assessment 12/21/2022   PRIMARY LEARNER Patient   PRIMARY LANGUAGE ENGLISH   LEARNER PREFERENCE PRIMARY DEMONSTRATION   ANSWERED BY patient   RELATIONSHIP SELF       Abuse Screening:  Abuse Screening Questionnaire 12/21/2022   Do you ever feel afraid of your partner? N   Are you in a relationship with someone who physically or mentally threatens you? N   Is it safe for you to go home? Y       Fall Risk  Fall Risk Assessment, last 12 mths 12/9/2020   Able to walk? Yes   Fall in past 12 months? No       Pt currently taking Anticoagulant therapy? Asa 81mg     Coordination of Care:  1. Have you been to the ER, urgent care clinic since your last visit? Hospitalized since your last visit? [facility] on 1/10 for SOB    2. Have you seen or consulted any other health care providers outside of the [address] [place] [address] since your last visit? Include any pap smears or colon screening.  no

## 2024-01-13 NOTE — PROGRESS NOTES
[facility]  [address] [place], [facility] [address]  Phone: [phone]  Fax: [phone]    NEW PATIENT  Pt's YOB: 1965    ASSESSMENT   Diagnoses and all orders for this visit:    1. Lumbar facet arthropathy    2. Muscle spasm    3. DDD (degenerative disc disease), lumbar    4. Post herpetic neuralgia  -     lidocaine (LIDODERM) 5 %; 1 Patch by TransDERmal route every twenty-four (24) hours. Apply patch to the affected area for 12 hours a day and remove for 12 hours a day. IMPRESSION AND PLAN:  Nohelia Marcos is a 64 y.o. female with history of lumbar pain. Pt complains of lumbar pain intermittently radiating down the lateral aspect of the bilateral legs (alternating sides) to the foot. She is taking Lyrica for nerve damage from shingles and has used lidocaine patches with relief. 1) Pt was given information on lumbar arthritis exercises. 2) Discussed treatment options with the patient including physical therapy, home exercises, steroid injections, and radiofrequency ablation. 3) She was prescribed lidocaine 5% patches. 4) Pt was advised to discuss vitamin D3 and zinc supplementation with her PCP. 5) I recommended the patient try water exercise and chair yoga. 6) Ms. Jenny Kat has a reminder for a \"due or due soon\" health maintenance. I have asked that she contact her primary care provider, Terri Feliz MD, for follow-up on this health maintenance. 7)  demonstrated consistency with prescribing. Follow-up and Dispositions    · Return in about 4 months (around 5/11/2022) for Medication follow up. HISTORY OF PRESENT ILLNESS:  Nohelia Marcos is a 64 y.o. right hand dominant female with history of lumbar pain.  Pt presents to the office today as a new patient referred by Terri Feliz MD. Pt complains of intermittent lumbar pain occasionally radiating down the lateral aspect of the right leg to the foot. She reports that she has previously enrolled in physical therapy and continues to use a HEP of core strengthening exercises every morning before rising from bed. Pt is taking Lyrica for nerve damage from shingles and has previously used lidocaine patches with relief. She notes that she has just finished a course of steroids with relief of her pain. Pt notes that Dr. Israel Santacruz previously reviewed her lumbar MRI and told her that it showed severe arthritis and height loss. She further reports that she has undergone previous lumbar steroid injections by Dr. Elizabeth Phelan with relief and is scheduled for surgical repair of a bunion by Dr. Betty Wilburn at [address] [place] [address] in 02/2022. She notes a history of shingles. Pt at this time desires to proceed with medication evaluation. Of note, pt has previously contracted COVID-19 and has cardiac sequelae (occasional skipped beats and intermittent tachycardia) from the infection. She also states that she has received the COVID-19 booster vaccine.     Pain Scale: 2/10     PCP: Blayne Gee MD    Past Medical History:   Diagnosis Date    Asthma     Nerve damage     Shingles         Social History     Socioeconomic History    Marital status:      Spouse name: Not on file    Number of children: Not on file    Years of education: Not on file    Highest education level: Not on file   Occupational History    Not on file   Tobacco Use    Smoking status: Never Smoker    Smokeless tobacco: Never Used   Substance and Sexual Activity    Alcohol use: Never    Drug use: Never    Sexual activity: Yes   Other Topics Concern    Not on file   Social History Narrative    Not on file     Social Determinants of Health     Financial Resource Strain:     Difficulty of Paying Living Expenses: Not on file   Food Insecurity:     Worried About Running Out of Food in the Last Year: Not on file    Josefina of Food in the Last Year: Not on file   Transportation Needs:     Lack of Transportation (Medical): Not on file    Lack of Transportation (Non-Medical): Not on file   Physical Activity:     Days of Exercise per Week: Not on file    Minutes of Exercise per Session: Not on file   Stress:     Feeling of Stress : Not on file   Social Connections:     Frequency of Communication with Friends and Family: Not on file    Frequency of Social Gatherings with Friends and Family: Not on file    Attends Scientologist Services: Not on file    Active Member of [address] [place] [address] [email or web address] or Organizations: Not on file    Attends Club or Organization Meetings: Not on file    Marital Status: Not on file   Intimate Partner Violence:     Fear of Current or Ex-Partner: Not on file    Emotionally Abused: Not on file    Physically Abused: Not on file    Sexually Abused: Not on file   Housing Stability:     Unable to Pay for Housing in the Last Year: Not on file    Number of [place] in the Last Year: Not on file    Unstable Housing in the Last Year: Not on file       Current Outpatient Medications   Medication Sig Dispense Refill    lidocaine (LIDODERM) 5 % 1 Patch by TransDERmal route every twenty-four (24) hours. Apply patch to the affected area for 12 hours a day and remove for 12 hours a day. 30 Each 2    albuterol (PROVENTIL HFA, VENTOLIN HFA, PROAIR HFA) 90 mcg/actuation inhaler Take 2 Puffs by inhalation.  pregabalin (LYRICA) 100 mg capsule TAKE 1 CAPSULE BY MOUTH EVERY EVENING      cetirizine HCl (ZYRTEC PO) Take 10 mg by mouth nightly.  fluticasone propionate (Flovent HFA) 110 mcg/actuation inhaler INHALE 2 PUFFS BY MOUTH TWICE A DAY         Allergies   Allergen Reactions    Latex, Natural Rubber Itching    Other Medication Hives and Other (comments)     SEAFOOD - \"throat closes\"    Seafood Hives    Sulfa (Sulfonamide Antibiotics) Hives and Other (comments)     \"Throat closes\"    Tetracycline Hives       REVIEW OF SYSTEMS    Constitutional: Negative for fever, chills, or weight change. Respiratory: Negative for cough or shortness of breath. Cardiovascular: Negative for chest pain or palpitations. Gastrointestinal: Negative for acid reflux, change in bowel habits, or constipation. Genitourinary: Negative for dysuria and flank pain. Musculoskeletal: Positive for lumbar, bilateral leg, and left foot pain. Skin: Negative for rash. Neurological: Negative for headaches, dizziness, or numbness. Endo/Heme/Allergies: Negative for increased bruising. Psychiatric/Behavioral: Negative for difficulty with sleep. As per HPI    PHYSICAL EXAMINATION  Visit Vitals  Pulse 94   Temp 96.9 °F (36.1 °C) (Skin)   Ht 5' 2\" (1.575 m)   Wt 165 lb (74.8 kg)   SpO2 98% Comment: RA   BMI 30.18 kg/m²       Constitutional: Awake, alert, and in no acute distress. HEENT: Normocephalic. Atraumatic. Oropharynx is moist and clear. PERRL. EOMI. Sclerae are nonicteric  Cardiovascular: Regular rate and rhythm  Lungs: Clear to auscultation bilaterally  Abdomen: Soft and nontender. Bowel sounds are present  Neurological: 1+ symmetrical DTRs in the upper extremities. 1+ symmetrical DTRs in the lower extremities. Sensation to light touch is intact. Negative Jimenez's sign bilaterally. Skin: warm, dry, and intact. Musculoskeletal: Tenderness to palpation over the lower lumbar region. Mild pain with extension, axial loading, and less with forward flexion. No pain with internal or external rotation of her hips. Negative straight leg raise bilaterally. Heel and toe walking not performed due to foot pain. No difficulty with the single leg stance bilaterally.       Biceps  Triceps Deltoids Wrist Ext Wrist Flex Hand Intrin   Right +4/5 +4/5 +4/5 +4/5 +4/5 +4/5   Left +4/5 +4/5 +4/5 +4/5 +4/5 +4/5      Hip Flex  Quads Hamstrings Ankle DF EHL Ankle PF   Right +4/5 +4/5 +4/5 +4/5 +4/5 +4/5   Left +4/5 +4/5 +4/5 +4/5 +4/5 +4/5     IMAGING:    Lumbar spine MRI from 11/15/2021 was personally reviewed with the patient and 13-Jan-2024 11:09 demonstrated:    FINDINGS:     General Comment: 5 lumbar-type segments are assumed, unless otherwise indicated.  Correlation with any outside studies, especially any radiographic series of the lumbar spine, is strongly recommended prior to considering any spine intervention. Spine and discs: Exaggerated lumbar lordosis. No listhesis. Degenerative fatty endplate changes noted anteriorly at L5-S1. Bone marrow signal is normal.     Please see below description of the intervertebral discs by level. DISTAL CORD/CONUS: Unremarkable. Tip of conus lies at L1. L1-2: No significant disc disease. Spinal canal and foramina are patent. L2-3: No significant disc disease. Spinal canal and foramina are patent. L3-4:No significant disc disease. Spinal canal and foramina are patent. Mild facet arthropathy. L4-5:No significant disc disease. Spinal canal and foramina are patent. Mild facet arthropathy. L5-S1: Disc desiccation and severe height loss. Spinal canal and foramina are patent. Mild facet arthropathy. VISUALIZED SACRUM: Unremarkable. PARASPINAL and RETROPERITONEAL: Unremarkable. ADDITIONAL FINDINGS: None.     _______________     IMPRESSION:     1. Mild lower lumbar spondylosis/facet arthropathy. No significant canal or foraminal stenosis to suggest nerve impingement. Abdominal/pelvic CT from 10/16/2021 was personally reviewed with the patient and demonstrated:     Findings:     Abdomen:     Lung bases: Stable 4 mm noncalcified solid nodule involving the left lower lobe (series 4, image 6) when compared to 1/27/2016. Visualized heart: Normal.   Pericardial space: Normal.   Visualized pleural spaces: Normal.   Visualized mediastinum: There is a small type I hiatal hernia. Liver: Normal.   Gallbladder/Biliary system: Normal.   Spleen: Normal.   Adrenal Glands: Normal.   Pancreas: Diffuse low attenuation, similar in appearance when compared to 7/10/2020.  In addition, relative loss of lobulated contour of the pancreatic parenchyma; however, the pancreatic gland is not enlarged and there is no convincing evidence for peripancreatic infiltrative/inflammatory change. No ductal dilatation nor parenchymal calcifications. Kidneys: Normal.   Stomach/Small bowel: The stomach demonstrates a normal morphology. Small bowel images normally. No small bowel wall thickening. Vasculature: There is trace atherosclerotic disease involving the abdominal aorta and iliac vasculature. The vessel caliber is within the limits of normal. The portal vein, SMV and splenic vein are patent. Peritoneum: No pneumoperitoneum, ascites or upper abdominal lymphadenopathy. Retroperitoneum: No retroperitoneal nor pelvic lymphadenopathy.   No  free pelvic fluid. Anterior Abdominal Wall: Intact. Pelvis:     Urogenital: Hysterectomy. No adnexal masses. Appendix : The appendix is not confidently identified; however, there are no secondary signs to suggest an acute appendicitis. Colon: Unremarkable. Rectum is normal.   Musculoskeletal: No suspicious lytic or blastic lesion of bone. There is mild multilevel degenerative change of the lower lumbar spine. Transitional anatomy at the lumbosacral junction with lumbarization of the S1 segment. IMPRESSION     ABDOMEN:     1. Diffuse low-attenuation of the pancreatic parenchyma, similar in appearance when compared to more remote examinations. In addition, relative loss of lobulated contour of the pancreatic folds. Although this may represent diffuse pancreatic parenchymal lipomatosis, recommend correlation with serum lipase and amylase levels to exclude mild pancreatitis or IgG4 mediated pancreatitis. 2. Small hiatal hernia. PELVIS:     1. No active inflammatory change within the pelvis. 2. Hysterectomy.            Written by Yon Dumas, as dictated by Mela Nageotte, MD.  I, Dr. Mela Nageotte confirm that all documentation is accurate.

## 2024-03-20 ENCOUNTER — TRANSCRIBE ORDERS (OUTPATIENT)
Facility: HOSPITAL | Age: 59
End: 2024-03-20

## 2024-03-20 DIAGNOSIS — R91.1 SOLITARY PULMONARY NODULE: Primary | ICD-10-CM

## 2024-04-11 ENCOUNTER — OFFICE VISIT (OUTPATIENT)
Age: 59
End: 2024-04-11
Payer: COMMERCIAL

## 2024-04-11 VITALS
BODY MASS INDEX: 31.83 KG/M2 | WEIGHT: 174 LBS | SYSTOLIC BLOOD PRESSURE: 120 MMHG | OXYGEN SATURATION: 98 % | DIASTOLIC BLOOD PRESSURE: 90 MMHG | HEART RATE: 94 BPM

## 2024-04-11 DIAGNOSIS — J45.909 UNCOMPLICATED ASTHMA, UNSPECIFIED ASTHMA SEVERITY, UNSPECIFIED WHETHER PERSISTENT: ICD-10-CM

## 2024-04-11 DIAGNOSIS — R07.9 CHEST PAIN, UNSPECIFIED TYPE: Primary | ICD-10-CM

## 2024-04-11 DIAGNOSIS — D86.9 SARCOID: ICD-10-CM

## 2024-04-11 PROCEDURE — 99214 OFFICE O/P EST MOD 30 MIN: CPT | Performed by: INTERNAL MEDICINE

## 2024-04-11 PROCEDURE — 93000 ELECTROCARDIOGRAM COMPLETE: CPT | Performed by: INTERNAL MEDICINE

## 2024-04-11 RX ORDER — PREDNISONE 10 MG/1
TABLET ORAL
COMMUNITY
Start: 2024-04-09

## 2024-04-11 NOTE — PROGRESS NOTES
History of Present Illness:  58 year-old female here for followup.  I last saw her a little over a year ago.  During her workup for atypical chest pain, chest CT was done and there was concern for a small lingular PE.  She had hematuria with Eliquis.  She has been followed by pulmonary and treated for pulmonary sarcoid and she is on Prednisone 10 mg daily.  She continues to have some atypical right sided chest pain.  She was in the hospital in January with sepsis and pneumonia, which has improved, but again still has the chest pain and is here for further evaluation.      Impression:   Intermittent atypical chest pain.    History of potential lingular PE by CT scan 01/2023, resolved 04/2023.   Recent pneumonia and sepsis 01/2024 at [facility] with some confusion, unremarkable MRI of the brain.   Heart catheterization 11/2020 after abnormal stress test.  No significant disease.  Last stress test 08/2022 without ischemia.    Echocardiogram 01/2023 with normal EF and no pulmonary hypertension.    History of palpitations and PACs by event monitor 09/2022.   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    History of asthma.      Plan:  Blood pressure is stable today and she continues to have atypical chest pain and some mild dyspnea, despite treatment with Prednisone for sarcoid.  She has never had a cardiac MRI.  At this point, I would like to follow up with a pharmacologic cardiac nuclear stress test and echocardiogram for completeness and see her back.  If these are unremarkable, I will consider ultimately cardiac MRI to exclude cardiac sarcoid.  All questions were answered.          Wt Readings from Last 3 Encounters:   04/11/24 78.9 kg (174 lb)   01/19/23 80.3 kg (177 lb)   01/19/23 79.4 kg (175 lb)     Past Medical History:   Diagnosis Date    Asthma     Nerve damage     Shingles        Current Outpatient Medications   Medication Sig Dispense Refill    predniSONE (DELTASONE) 10 MG tablet

## 2024-04-17 ENCOUNTER — HOSPITAL ENCOUNTER (OUTPATIENT)
Facility: HOSPITAL | Age: 59
Discharge: HOME | End: 2024-04-19
Attending: INTERNAL MEDICINE
Payer: COMMERCIAL

## 2024-04-17 ENCOUNTER — HOSPITAL ENCOUNTER (OUTPATIENT)
Facility: HOSPITAL | Age: 59
Discharge: HOME | End: 2024-04-20
Attending: INTERNAL MEDICINE
Payer: COMMERCIAL

## 2024-04-17 VITALS
WEIGHT: 174 LBS | BODY MASS INDEX: 32.02 KG/M2 | HEIGHT: 62 IN | SYSTOLIC BLOOD PRESSURE: 142 MMHG | HEART RATE: 113 BPM | DIASTOLIC BLOOD PRESSURE: 92 MMHG

## 2024-04-17 DIAGNOSIS — R07.9 CHEST PAIN, UNSPECIFIED TYPE: ICD-10-CM

## 2024-04-17 DIAGNOSIS — D86.9 SARCOID: ICD-10-CM

## 2024-04-17 LAB
ECHO BSA: 1.86 M2
NUC STRESS EJECTION FRACTION: 80 %
STRESS BASELINE DIAS BP: 94 MMHG
STRESS BASELINE HR: 83 BPM
STRESS BASELINE SYS BP: 158 MMHG
STRESS ESTIMATED WORKLOAD: 1 METS
STRESS EXERCISE DUR MIN: 4 MIN
STRESS EXERCISE DUR SEC: 0 SEC
STRESS PEAK DIAS BP: 94 MMHG
STRESS PEAK SYS BP: 158 MMHG
STRESS PERCENT HR ACHIEVED: 78 %
STRESS POST PEAK HR: 127 BPM
STRESS RATE PRESSURE PRODUCT: NORMAL BPM*MMHG
STRESS TARGET HR: 162 BPM
TID: 0.91

## 2024-04-17 PROCEDURE — 6360000002 HC RX W HCPCS: Performed by: INTERNAL MEDICINE

## 2024-04-17 PROCEDURE — 93018 CV STRESS TEST I&R ONLY: CPT | Performed by: INTERNAL MEDICINE

## 2024-04-17 PROCEDURE — A9502 TC99M TETROFOSMIN: HCPCS | Performed by: INTERNAL MEDICINE

## 2024-04-17 PROCEDURE — 93017 CV STRESS TEST TRACING ONLY: CPT

## 2024-04-17 PROCEDURE — 78452 HT MUSCLE IMAGE SPECT MULT: CPT | Performed by: INTERNAL MEDICINE

## 2024-04-17 PROCEDURE — 93016 CV STRESS TEST SUPVJ ONLY: CPT | Performed by: INTERNAL MEDICINE

## 2024-04-17 PROCEDURE — 2580000003 HC RX 258: Performed by: INTERNAL MEDICINE

## 2024-04-17 PROCEDURE — 78452 HT MUSCLE IMAGE SPECT MULT: CPT

## 2024-04-17 PROCEDURE — 3430000000 HC RX DIAGNOSTIC RADIOPHARMACEUTICAL: Performed by: INTERNAL MEDICINE

## 2024-04-17 RX ORDER — REGADENOSON 0.08 MG/ML
0.4 INJECTION, SOLUTION INTRAVENOUS
Status: COMPLETED | OUTPATIENT
Start: 2024-04-17 | End: 2024-04-17

## 2024-04-17 RX ORDER — 0.9 % SODIUM CHLORIDE 0.9 %
250 INTRAVENOUS SOLUTION INTRAVENOUS ONCE
Status: COMPLETED | OUTPATIENT
Start: 2024-04-17 | End: 2024-04-17

## 2024-04-17 RX ADMIN — TETROFOSMIN 11 MILLICURIE: 1.38 INJECTION, POWDER, LYOPHILIZED, FOR SOLUTION INTRAVENOUS at 06:30

## 2024-04-17 RX ADMIN — REGADENOSON 0.4 MG: 0.08 INJECTION, SOLUTION INTRAVENOUS at 08:10

## 2024-04-17 RX ADMIN — SODIUM CHLORIDE 250 ML: 9 INJECTION, SOLUTION INTRAVENOUS at 08:10

## 2024-04-17 RX ADMIN — TETROFOSMIN 33 MILLICURIE: 1.38 INJECTION, POWDER, LYOPHILIZED, FOR SOLUTION INTRAVENOUS at 08:10

## 2024-04-22 ENCOUNTER — TELEPHONE (OUTPATIENT)
Age: 59
End: 2024-04-22

## 2024-07-11 ENCOUNTER — HOSPITAL ENCOUNTER (OUTPATIENT)
Facility: HOSPITAL | Age: 59
Discharge: HOME | End: 2024-07-11
Payer: COMMERCIAL

## 2024-07-11 DIAGNOSIS — R91.1 SOLITARY PULMONARY NODULE: ICD-10-CM

## 2024-07-11 PROCEDURE — 71250 CT THORAX DX C-: CPT

## 2024-07-30 ENCOUNTER — OFFICE VISIT (OUTPATIENT)
Age: 59
End: 2024-07-30
Payer: COMMERCIAL

## 2024-07-30 VITALS
HEART RATE: 88 BPM | WEIGHT: 173 LBS | HEIGHT: 62 IN | BODY MASS INDEX: 31.83 KG/M2 | DIASTOLIC BLOOD PRESSURE: 79 MMHG | OXYGEN SATURATION: 99 % | SYSTOLIC BLOOD PRESSURE: 120 MMHG

## 2024-07-30 DIAGNOSIS — Z86.711 HISTORY OF PULMONARY EMBOLISM: ICD-10-CM

## 2024-07-30 DIAGNOSIS — R07.9 CHEST PAIN, UNSPECIFIED TYPE: ICD-10-CM

## 2024-07-30 DIAGNOSIS — R00.2 PALPITATIONS: Primary | ICD-10-CM

## 2024-07-30 DIAGNOSIS — I47.11 INAPPROPRIATE SINUS TACHYCARDIA (HCC): ICD-10-CM

## 2024-07-30 DIAGNOSIS — D86.9 SARCOID: ICD-10-CM

## 2024-07-30 PROCEDURE — 93000 ELECTROCARDIOGRAM COMPLETE: CPT | Performed by: INTERNAL MEDICINE

## 2024-07-30 PROCEDURE — 99214 OFFICE O/P EST MOD 30 MIN: CPT | Performed by: INTERNAL MEDICINE

## 2024-07-30 RX ORDER — MONTELUKAST SODIUM 10 MG/1
10 TABLET ORAL DAILY
COMMUNITY

## 2024-07-30 RX ORDER — CETIRIZINE HYDROCHLORIDE 10 MG/1
1 TABLET ORAL DAILY
COMMUNITY

## 2024-07-30 ASSESSMENT — PATIENT HEALTH QUESTIONNAIRE - PHQ9
SUM OF ALL RESPONSES TO PHQ QUESTIONS 1-9: 0
DEPRESSION UNABLE TO ASSESS: FUNCTIONAL CAPACITY MOTIVATION LIMITS ACCURACY
2. FEELING DOWN, DEPRESSED OR HOPELESS: NOT AT ALL
SUM OF ALL RESPONSES TO PHQ9 QUESTIONS 1 & 2: 0
SUM OF ALL RESPONSES TO PHQ QUESTIONS 1-9: 0
1. LITTLE INTEREST OR PLEASURE IN DOING THINGS: NOT AT ALL

## 2024-07-30 ASSESSMENT — ENCOUNTER SYMPTOMS
GASTROINTESTINAL NEGATIVE: 1
RESPIRATORY NEGATIVE: 1
EYES NEGATIVE: 1

## 2024-07-30 NOTE — PROGRESS NOTES
Cherie Garcia is a 59 y.o. year old female.    7/30/2024 switching care from Dr. Morrison due to personal reasons.  She was being followed for the following  1. Intermittent atypical chest pain.    2. History of potential lingular PE by CT scan 01/2023, resolved 04/2023.   3. Recent pneumonia and sepsis 01/2024 at [facility] with some confusion, unremarkable MRI of the brain.   4. Heart catheterization 11/2020 after abnormal stress test.  No significant disease.  Last stress test 08/2022 without ischemia.    5. Echocardiogram 01/2023 with normal EF and no pulmonary hypertension.    6. History of palpitations and PACs by event monitor 09/2022.   7. 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    8. History of asthma.    Palpitations have been been happening for a few years few times a week lasting few minutes.  She has a Apple Watch with EKG and during the episodes, sinus tachycardia is noted even though Apple watch interpreted atrial fibrillation (I disagree with it).  No relation with food physical or mental stress.  Chest discomfort has been happening since January this year about once a week without any relation to activity or food.  The last less than a minute usually.          Review of Systems   Constitutional: Negative.    HENT: Negative.     Eyes: Negative.    Respiratory: Negative.     Cardiovascular:  Positive for chest pain and palpitations.   Gastrointestinal: Negative.    Endocrine: Negative.    Genitourinary: Negative.    Musculoskeletal: Negative.    Neurological: Negative.    Psychiatric/Behavioral: Negative.     All other systems reviewed and are negative.        Physical Exam  Vitals and nursing note reviewed.   Constitutional:       Appearance: Normal appearance. She is obese.   HENT:      Head: Normocephalic and atraumatic.      Nose: Nose normal.   Eyes:      Conjunctiva/sclera: Conjunctivae normal.   Cardiovascular:      Rate and Rhythm: Normal rate and regular rhythm.

## (undated) DEVICE — DRAPE,ANGIO,BRACH,STERILE,38X44: Brand: MEDLINE

## (undated) DEVICE — GLIDESHEATH SLENDER STAINLESS STEEL KIT: Brand: GLIDESHEATH SLENDER

## (undated) DEVICE — RADIFOCUS OPTITORQUE ANGIOGRAPHIC CATHETER: Brand: OPTITORQUE

## (undated) DEVICE — PROCEDURE KIT FLUID MGMT 10 FR CUST MAINFOLD

## (undated) DEVICE — PACK PROCEDURE SURG VASC CATH 161 MMC LF

## (undated) DEVICE — PRESSURE MONITORING SET: Brand: TRUWAVE

## (undated) DEVICE — SET FLD ADMIN 3 W STPCOCK FIX FEM L BOR 1IN

## (undated) DEVICE — TR BAND RADIAL ARTERY COMPRESSION DEVICE: Brand: TR BAND